# Patient Record
Sex: FEMALE | Race: WHITE | NOT HISPANIC OR LATINO | URBAN - METROPOLITAN AREA
[De-identification: names, ages, dates, MRNs, and addresses within clinical notes are randomized per-mention and may not be internally consistent; named-entity substitution may affect disease eponyms.]

---

## 2017-01-12 ENCOUNTER — OFFICE VISIT (OUTPATIENT)
Dept: OPHTHALMOLOGY | Facility: CLINIC | Age: 67
End: 2017-01-12
Attending: OPHTHALMOLOGY
Payer: COMMERCIAL

## 2017-01-12 VITALS — BODY MASS INDEX: 25.06 KG/M2 | HEIGHT: 69 IN | WEIGHT: 169.2 LBS

## 2017-01-12 DIAGNOSIS — Z98.890 STATUS POST LASIK SURGERY OF BOTH EYES: ICD-10-CM

## 2017-01-12 DIAGNOSIS — H25.12 SENILE NUCLEAR SCLEROSIS, LEFT: ICD-10-CM

## 2017-01-12 DIAGNOSIS — H26.491 POSTERIOR CAPSULAR OPACIFICATION VISUALLY SIGNIFICANT OF RIGHT EYE: ICD-10-CM

## 2017-01-12 DIAGNOSIS — H35.341 LAMELLAR MACULAR HOLE OF RIGHT EYE: Primary | ICD-10-CM

## 2017-01-12 PROCEDURE — 66821 AFTER CATARACT LASER SURGERY: CPT | Mod: ZF | Performed by: OPHTHALMOLOGY

## 2017-01-12 PROCEDURE — 92134 CPTRZ OPH DX IMG PST SGM RTA: CPT | Mod: ZF | Performed by: OPHTHALMOLOGY

## 2017-01-12 PROCEDURE — 76519 ECHO EXAM OF EYE: CPT | Mod: ZF | Performed by: OPHTHALMOLOGY

## 2017-01-12 RX ORDER — PREDNISOLONE ACETATE 10 MG/ML
1 SUSPENSION/ DROPS OPHTHALMIC 4 TIMES DAILY
Qty: 1 BOTTLE | Refills: 1 | Status: SHIPPED | OUTPATIENT
Start: 2017-01-12 | End: 2017-02-08

## 2017-01-12 ASSESSMENT — TONOMETRY
OS_IOP_MMHG: 21
OS_IOP_MMHG: 22
OD_IOP_MMHG: 18
IOP_METHOD: TONOPEN
IOP_METHOD: TONOPEN

## 2017-01-12 ASSESSMENT — VISUAL ACUITY
OD_CC: 20/70-
METHOD: SNELLEN - LINEAR
OS_CC: 20/70-

## 2017-01-12 ASSESSMENT — REFRACTION_MANIFEST
OD_CYLINDER: SPHERE
OS_SPHERE: -6.25
OD_SPHERE: -3.00
OS_CYLINDER: +2.00
OS_AXIS: 075

## 2017-01-12 ASSESSMENT — CUP TO DISC RATIO
OD_RATIO: 0.2
OS_RATIO: 0.2

## 2017-01-12 ASSESSMENT — EXTERNAL EXAM - RIGHT EYE: OD_EXAM: NORMAL

## 2017-01-12 ASSESSMENT — SLIT LAMP EXAM - LIDS
COMMENTS: NORMAL
COMMENTS: NORMAL

## 2017-01-12 ASSESSMENT — EXTERNAL EXAM - LEFT EYE: OS_EXAM: NORMAL

## 2017-01-12 NOTE — PROGRESS NOTES
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  Interested in proceeding with cataract extraction left eye.    Floaters OD stable. No flashes.  Glasses are 3 yrs old.    Eye drops: None    A/P:    Cataract left eye. Appears visually significant, myopic shift left eye  +PVD OU  +myopic degen OU (lamellar macular hole RE; last saw outside [facility] 1 yr ago now retired)    + FH glaucoma mother and maternal grandmother    Risks and benefits of cataract surgery in the left eye were discussed with the patient, including the risk of pain, bleeding, infection, inflammation, elevated eye pressure, the need for additional surgery, loss of vision, loss of the eye, and the possible need to wear glasses postoperatively.      Anesthesia: topical    Trypan blue: No    IFIS solution: No    Malyugin ring: No    Refractive target: -2.00 (to match target of right eye, which ended up -3.00)    IOL type: 1 piece    Toric IOL:  no  Additional considerations:  Post laser in situ keratomileusis (LASIK) calcs      Lamellar macular hole right eye   OCT macula 1/12/17:    Lamellar hole right eye , appears stable    posterior capsular opacity (PCO) right eye    - visually significant with drop in best corrected visual acuity and glare   - rec YAG cap right eye today - completed without complication   - topical Predforte  Four times a day  X 4 days       ~~~~~~~~~~~~~~~~~~~~~~~~~~~~~~~~~~~~~~~~~~~~~~~~~~~~~~~~~~~~~~~~    I have personally examined the patient and agree with the assessment and plan as delineated by the resident / fellow.  I have confirmed the contents of the chief complaint, history of present illness, review of systems, and medical / surgical history sections and edited the note as needed.    Pramod Britt MD, MA  Director, Cornea & Anterior Segment  [facility] Department of  Ophthalmology & Visual Neuroscience

## 2017-01-12 NOTE — MR AVS SNAPSHOT
After Visit Summary   1/12/2017    Marilu Rondon    MRN: [id number]           Patient Information     Date Of Birth          1950        Visit Information        Provider Department      1/12/2017 9:30 AM Pramod Britt MD [facility]        Today's Diagnoses     Lamellar macular hole of right eye    -  1     Senile nuclear sclerosis, left         Status post LASIK surgery of both eyes         Posterior capsular opacification visually significant of right eye            Follow-ups after your visit        Your next 10 appointments already scheduled     Feb 08, 2017  2:00 PM   (Arrive by 1:45 PM)   PAC PHONE RN ASSESSMENT with  Pac Rn   [facility] ([facility])    [address]  4th Floor  [facility] [id number] [phone]           Note: this is not an onsite visit; there is no need to come to the facility.            Feb 15, 2017   Procedure with Pramod Britt MD   [facility] ([facility])    [address] [place] [address]  5th [facility] [id number] [phone]           Located in the Clinics and Surgery Center at [address] [place] [address], [place] [address].   parking is very convenient and highly recommended.  is a $6 flat rate fee; no tips accepted.  Both  and self parkers should enter the main arrival plaza from [facility]; parking attendants will direct you based on your parking preference.            Feb 16, 2017 10:30 AM   Post-Op with Pramod Britt MD   Eye Clinic ([facility])    Y[address]f [address]anes  [address]   [facility] [address] [place] [id number] [phone]            Feb 23, 2017  1:15 PM   Post-Op with Pramod Britt MD   Eye Clinic ([facility])    [address]  [address]   [facility] [address] [place] [address]   [phone]              Who to contact     Please call  "your clinic at [phone] to:    Ask questions about your health    Make or cancel appointments    Discuss your medicines    Learn about your test results    Speak to your doctor   If you have compliments or concerns about an experience at your clinic, or if you wish to file a complaint, please contact [facility] Patient Relations at [phone] or email us at [email or web address]         Additional Information About Your Visit        [employer] Information     QuicklyChat is an electronic gateway that provides easy, online access to your medical records. With QuicklyChat, you can request a clinic appointment, read your test results, renew a prescription or communicate with your care team.     To sign up for QuicklyChat visit the website at [email or web address]   You will be asked to enter the access code listed below, as well as some personal information. Please follow the directions to create your username and password.     Your access code is: [email or web address]  Expires: 2017  8:30 AM     Your access code will  in 90 days. If you need help or a new code, please contact your [facility] or call [phone] for assistance.        Care EveryWhere ID     This is your Care EveryWhere ID. This could be used by other organizations to access your [facility] medical records  [id number]        Your Vitals Were     Height BMI (Body Mass Index)                1.74 m (5' 8.5\") 25.35 kg/m2           Blood Pressure from Last 3 Encounters:   16 98/69   16 110/60   09/08/15 94/62    Weight from Last 3 Encounters:   17 76.749 kg (169 lb 3.2 oz)   16 76.658 kg (169 lb)   16 76.658 kg (169 lb)              We Performed the Following     IOL Biometry w/ IOL calc OU (both eye)     OCT Retina Spectralis OU (both eyes)     Lia-Operative Worksheet     YAG Capsulotomy OD (right eye)          Today's Medication Changes          These changes " are accurate as of: 1/12/17 12:36 PM.  If you have any questions, ask your nurse or doctor.               Start taking these medicines.        Dose/Directions    prednisoLONE acetate 1 % ophthalmic susp   Commonly known as:  PRED FORTE   Used for:  Posterior capsular opacification visually significant of right eye        Dose:  1 drop   Place 1 drop into the right eye 4 times daily   Quantity:  1 Bottle   Refills:  1            Where to get your medicines      These medications were sent to [facility] [address]  [address] [place] [address] [address] [facility] [address]     Phone:  [phone]    - prednisoLONE acetate 1 % ophthalmic susp             Primary Care Provider Office Phone # Fax #    Estefanía Ernandez [phone]       [address] [address]        Thank you!     Thank you for choosing E[facility] CLINIC  for your care. Our goal is always to provide you with excellent care. Hearing back from our patients is one way we can continue to improve our services. Please take a few minutes to complete the written survey that you may receive in the mail after your visit with us. Thank you!             Your Updated Medication List - Protect others around you: Learn how to safely use, store and throw away your medicines at www.disposemymeds.org.          This list is accurate as of: 1/12/17 12:36 PM.  Always use your most recent med list.                   Brand Name Dispense Instructions for use    * order for DME     1 Units    Roll-A-Bout Walker. Patient can use for up to 2 months       * order for DME     1 Units    Walker       prednisoLONE acetate 1 % ophthalmic susp    PRED FORTE    1 Bottle    Place 1 drop into the right eye 4 times daily       SIMVASTATIN PO      Take 20 mg by mouth daily       * Notice:  This list has 2 medication(s) that are the same as other medications prescribed for you. Read the directions  carefully, and ask your doctor or other care provider to review them with you.

## 2017-02-08 ENCOUNTER — APPOINTMENT (OUTPATIENT)
Dept: SURGERY | Facility: CLINIC | Age: 67
End: 2017-02-08

## 2017-02-15 ENCOUNTER — HOSPITAL ENCOUNTER (OUTPATIENT)
Facility: AMBULATORY SURGERY CENTER | Age: 67
End: 2017-02-15
Attending: OPHTHALMOLOGY

## 2017-02-15 ENCOUNTER — ANESTHESIA EVENT (OUTPATIENT)
Dept: SURGERY | Facility: AMBULATORY SURGERY CENTER | Age: 67
End: 2017-02-15

## 2017-02-15 ENCOUNTER — ANESTHESIA (OUTPATIENT)
Dept: SURGERY | Facility: AMBULATORY SURGERY CENTER | Age: 67
End: 2017-02-15

## 2017-02-15 VITALS
TEMPERATURE: 97.4 F | SYSTOLIC BLOOD PRESSURE: 116 MMHG | RESPIRATION RATE: 16 BRPM | DIASTOLIC BLOOD PRESSURE: 73 MMHG | OXYGEN SATURATION: 99 %

## 2017-02-15 DIAGNOSIS — H25.12 NUCLEAR SCLEROTIC CATARACT, LEFT: Primary | ICD-10-CM

## 2017-02-15 DEVICE — IMPLANTABLE DEVICE: Type: IMPLANTABLE DEVICE | Site: EYE | Status: FUNCTIONAL

## 2017-02-15 RX ORDER — LIDOCAINE HYDROCHLORIDE 10 MG/ML
INJECTION, SOLUTION EPIDURAL; INFILTRATION; INTRACAUDAL; PERINEURAL PRN
Status: DISCONTINUED | OUTPATIENT
Start: 2017-02-15 | End: 2017-02-15 | Stop reason: HOSPADM

## 2017-02-15 RX ORDER — SODIUM CHLORIDE, SODIUM LACTATE, POTASSIUM CHLORIDE, CALCIUM CHLORIDE 600; 310; 30; 20 MG/100ML; MG/100ML; MG/100ML; MG/100ML
500 INJECTION, SOLUTION INTRAVENOUS CONTINUOUS
Status: DISCONTINUED | OUTPATIENT
Start: 2017-02-15 | End: 2017-02-16 | Stop reason: HOSPADM

## 2017-02-15 RX ORDER — PREDNISOLONE ACETATE 10 MG/ML
1 SUSPENSION/ DROPS OPHTHALMIC 4 TIMES DAILY
Qty: 1 BOTTLE | Refills: 0 | Status: SHIPPED | OUTPATIENT
Start: 2017-02-15 | End: 2017-03-22

## 2017-02-15 RX ORDER — TROPICAMIDE 10 MG/ML
1 SOLUTION/ DROPS OPHTHALMIC
Status: COMPLETED | OUTPATIENT
Start: 2017-02-15 | End: 2017-02-15

## 2017-02-15 RX ORDER — PHENYLEPHRINE HYDROCHLORIDE 25 MG/ML
1 SOLUTION/ DROPS OPHTHALMIC
Status: COMPLETED | OUTPATIENT
Start: 2017-02-15 | End: 2017-02-15

## 2017-02-15 RX ORDER — PROPARACAINE HYDROCHLORIDE 5 MG/ML
1 SOLUTION/ DROPS OPHTHALMIC ONCE
Status: COMPLETED | OUTPATIENT
Start: 2017-02-15 | End: 2017-02-15

## 2017-02-15 RX ORDER — KETOROLAC TROMETHAMINE 5 MG/ML
1 SOLUTION OPHTHALMIC 4 TIMES DAILY
Qty: 5 ML | Refills: 0 | Status: SHIPPED | OUTPATIENT
Start: 2017-02-15 | End: 2017-03-22

## 2017-02-15 RX ORDER — LIDOCAINE 40 MG/G
CREAM TOPICAL
Status: DISCONTINUED | OUTPATIENT
Start: 2017-02-15 | End: 2017-02-16 | Stop reason: HOSPADM

## 2017-02-15 RX ORDER — OFLOXACIN 3 MG/ML
1 SOLUTION/ DROPS OPHTHALMIC 4 TIMES DAILY
Qty: 1 BOTTLE | Refills: 0 | Status: SHIPPED | OUTPATIENT
Start: 2017-02-15 | End: 2017-03-22

## 2017-02-15 RX ORDER — BALANCED SALT SOLUTION 6.4; .75; .48; .3; 3.9; 1.7 MG/ML; MG/ML; MG/ML; MG/ML; MG/ML; MG/ML
SOLUTION OPHTHALMIC PRN
Status: DISCONTINUED | OUTPATIENT
Start: 2017-02-15 | End: 2017-02-15 | Stop reason: HOSPADM

## 2017-02-15 RX ADMIN — TROPICAMIDE 1 DROP: 10 SOLUTION/ DROPS OPHTHALMIC at 06:30

## 2017-02-15 RX ADMIN — TROPICAMIDE 1 DROP: 10 SOLUTION/ DROPS OPHTHALMIC at 06:35

## 2017-02-15 RX ADMIN — SODIUM CHLORIDE, SODIUM LACTATE, POTASSIUM CHLORIDE, CALCIUM CHLORIDE: 600; 310; 30; 20 INJECTION, SOLUTION INTRAVENOUS at 07:24

## 2017-02-15 RX ADMIN — TROPICAMIDE 1 DROP: 10 SOLUTION/ DROPS OPHTHALMIC at 06:42

## 2017-02-15 RX ADMIN — SODIUM CHLORIDE, SODIUM LACTATE, POTASSIUM CHLORIDE, CALCIUM CHLORIDE 500 ML: 600; 310; 30; 20 INJECTION, SOLUTION INTRAVENOUS at 07:16

## 2017-02-15 RX ADMIN — PHENYLEPHRINE HYDROCHLORIDE 1 DROP: 25 SOLUTION/ DROPS OPHTHALMIC at 06:41

## 2017-02-15 RX ADMIN — PHENYLEPHRINE HYDROCHLORIDE 1 DROP: 25 SOLUTION/ DROPS OPHTHALMIC at 06:35

## 2017-02-15 RX ADMIN — PHENYLEPHRINE HYDROCHLORIDE 1 DROP: 25 SOLUTION/ DROPS OPHTHALMIC at 06:30

## 2017-02-15 RX ADMIN — PROPARACAINE HYDROCHLORIDE 1 DROP: 5 SOLUTION/ DROPS OPHTHALMIC at 07:16

## 2017-02-15 NOTE — IP AVS SNAPSHOT
MRN:[id number]                      After Visit Summary   2/15/2017    Marilu Rondon    MRN: [id number]           Thank you!     Thank you for choosing [facility] for your care. Our goal is always to provide you with excellent care. Hearing back from our patients is one way we can continue to improve our services. Please take a few minutes to complete the written survey that you may receive in the mail after you visit with us. Thank you!        Patient Information     Date Of Birth          1950        About your hospital stay     You were admitted on:  February 15, 2017 You last received care in the:  [facility]    You were discharged on:  February 15, 2017       Who to Call     For medical emergencies, please call 911.  For non-urgent questions about your medical care, please call your primary care provider or clinic, [phone]  For questions related to your surgery, please call your surgery clinic        Attending Provider     Provider Specialty    Pramod Britt MD Ophthalmology       Primary Care Provider Office Phone # Fax #    Estefanía Ernandez [phone]       [address] [address]        Your next 10 appointments already scheduled     Feb 16, 2017 10:30 AM CST   Post-Op with Pramod Britt MD   Eye Clinic ([facility])    [address]  [address] [place] [id number]   [phone]            Feb 23, 2017  1:15 PM CST   Post-Op with Pramod Britt MD   Eye Clinic ([facility])    [address]  [address] [place] [address]   [phone]              Further instructions from your care team       [facility]     Home Care Following Cataract Surgery    If you have a gauze eye patch on, please do not remove it until it is removed by your doctor at your  first appointment after your surgery.  You will start your eye drops the next day.    OR    If you only have a clear eye shield on, you may remove the eye shield when you get home and begin eye drops today as directed by your doctor.      Wear the clear eye shield for protection when sleeping for the next 5 days.      Do not rub the eye that had the operation.      Your eye might be sensitive to light.  Wearing sunglasses may be more comfortable for you.      You may have some discomfort and irritation.  Acetaminophen (Tylenol) or Ibuprofen (Advil) may be taken for discomfort. If pain persists please call your doctor s office.      Keep the eye that had the surgery dry. You may wash your hair, bathe or shower, but keep your eye closed while doing so.       Avoid bending over, strenuous activity or heavy lifting until this activity has been approved by your doctor.      You have a follow-up appointment with your doctor tomorrow at the [facility] ([phone]).  Bring all your prescribed eye drops with you to this follow-up appointment.        If you take glaucoma medications, bring them with you to your follow-up appointment tomorrow.      Use medication exactly as prescribed by your doctor. You may restart your regular home medications.       Call your doctor s office at [phone] if any of the following should occur:    - Any sudden vision changes, including decreased vision  - Nausea or severe headache  - Increase in pain that is not controlled with Acetaminophen (Tylenol) or Ibuprofen (Advil)  - Signs of infection (pus, increasing redness or tenderness)  - Severe sensitivity to light  - An increase in floaters (black spots in front of your vision)    [facility]  Home Care Following Anesthesia  For 24 hours after surgery:  1. Get plenty of rest.  A responsible adult must stay with you for at least 24 hours after you leave the surgery center.  2. Do  not drive or use heavy equipment.  If you have weakness or tingling, don't drive or use heavy equipment until this feeling goes away.   3. Do not drink alcohol.   4. Avoid strenuous or risky activities.  Ask for help when climbing stairs.  5. You may feel lightheaded.  IF so, sit for a few minutes before standing.  Have someone help you get up.   6. If you have nausea (feel sick to your stomach): Drink only clear liquids such as apple juice, ginger ale, broth or 7-Up.  Rest may also help.  Be sure to drink enough fluids.  Move to a regular diet as you feel able.   7. You may have a slight fever.  Call the doctor if your fever is over 100 F (37.7 C) (taken under the tongue) or lasts longer than 24 hours.  8. You may have a dry mouth, a sore throat, muscle aches or trouble sleeping. These should go away after 24 hours.  9. Do not make important or legal decisions.               Tips for taking pain medications  To get the best pain relief possible, remember these points:    Take pain medications as directed, before pain becomes severe.    Pain medication can upset your stomach: taking it with food may help.    Constipation is a common side effect of pain medication. Drink plenty of  fluids.    Eat foods high in fiber. Take a stool softener if recommended by your doctor or pharmacist.    Do not drink alcohol, drive or operate machinery while taking pain medications.    Ask about other ways to control pain, such as with heat, ice or relaxation.    Call a doctor for any of the followin. Signs of infection (fever, growing tenderness at the surgery site, a large amount of drainage or bleeding, severe pain, foul-smelling drainage, redness, swelling).  2. It has been over 8 to 10 hours since surgery and you are still not able to urinate (pass water).  3. Headache for over 24 hours.  4. Numbness, tingling or weakness the day after surgery (if you had spinal anesthesia).  Your doctor is:       Dr. Pramod Britt,  Ophthalmology: [phone]               Or dial [phone] and ask for the resident on call for:  Ophthalmology  For emergency care, call the:  [place]:  [phone] (TTY for hearing impaired: [phone])    Pending Results     No orders found from 2017 to 2017.            Admission Information     Date & Time Provider Department Dept. Phone    2/15/2017 Page, Pramod Cruz MD [facility] [phone]      Your Vitals Were     Blood Pressure Temperature Respirations Pulse Oximetry          110/72 97.6  F (36.4  C) (Oral) 16 97%        MyChart Information     Dovetailt is an electronic gateway that provides easy, online access to your medical records. With Spock, you can request a clinic appointment, read your test results, renew a prescription or communicate with your care team.     To sign up for Spock visit the website at [email or web address]   You will be asked to enter the access code listed below, as well as some personal information. Please follow the directions to create your username and password.     Your access code is: [email or web address]  Expires: 2017  8:30 AM     Your access code will  in 90 days. If you need help or a new code, please contact your [facility] or call [phone] for assistance.        Care EveryWhere ID     This is your Care EveryWhere ID. This could be used by other organizations to access your [facility] medical records  [id number]           Review of your medicines      UNREVIEWED medicines. Ask your doctor about these medicines        Dose / Directions    SIMVASTATIN PO        Dose:  20 mg   Take 20 mg by mouth every morning   Refills:  0         START taking        Dose / Directions    ketorolac 0.5 % ophthalmic solution   Commonly known as:  ACULAR   Used for:  Nuclear sclerotic cataract, left        Dose:  1 drop   Place 1 drop Into the left eye 4 times daily    Quantity:  5 mL   Refills:  0       ofloxacin 0.3 % ophthalmic solution   Commonly known as:  OCUFLOX   Used for:  Nuclear sclerotic cataract, left        Dose:  1 drop   Place 1 drop Into the left eye 4 times daily   Quantity:  1 Bottle   Refills:  0       prednisoLONE acetate 1 % ophthalmic susp   Commonly known as:  PRED FORTE   Used for:  Nuclear sclerotic cataract, left        Dose:  1 drop   Place 1 drop Into the left eye 4 times daily   Quantity:  1 Bottle   Refills:  0         CONTINUE these medicines which have NOT CHANGED        Dose / Directions    * order for DME   Used for:  Closed displaced fracture of fifth metatarsal bone of left foot, initial encounter        Roll-A-Bout Walker. Patient can use for up to 2 months   Quantity:  1 Units   Refills:  0       * order for DME   Used for:  Closed displaced fracture of fifth metatarsal bone of left foot with routine healing, subsequent encounter        Walker   Quantity:  1 Units   Refills:  0       * Notice:  This list has 2 medication(s) that are the same as other medications prescribed for you. Read the directions carefully, and ask your doctor or other care provider to review them with you.         Where to get your medicines      These medications were sent to [address] [address]    Hours:  TRANSPLANT PHONE NUMBER [phone] Phone:  [phone]     ketorolac 0.5 % ophthalmic solution    ofloxacin 0.3 % ophthalmic solution    prednisoLONE acetate 1 % ophthalmic susp                Protect others around you: Learn how to safely use, store and throw away your medicines at www.disposemymeds.org.             Medication List: This is a list of all your medications and when to take them. Check marks below indicate your daily home schedule. Keep this list as a reference.      Medications           Morning Afternoon Evening Bedtime As Needed     ketorolac 0.5 % ophthalmic solution   Commonly known as:  ACULAR   Place 1 drop Into the left eye 4 times daily                                ofloxacin 0.3 % ophthalmic solution   Commonly known as:  OCUFLOX   Place 1 drop Into the left eye 4 times daily                                * order for DME   Roll-A-Bout Walker. Patient can use for up to 2 months                                * order for DME   Walker                                prednisoLONE acetate 1 % ophthalmic susp   Commonly known as:  PRED FORTE   Place 1 drop Into the left eye 4 times daily                                SIMVASTATIN PO   Take 20 mg by mouth every morning                                * Notice:  This list has 2 medication(s) that are the same as other medications prescribed for you. Read the directions carefully, and ask your doctor or other care provider to review them with you.

## 2017-02-15 NOTE — DISCHARGE INSTRUCTIONS
[facility]     Home Care Following Cataract Surgery    If you have a gauze eye patch on, please do not remove it until it is removed by your doctor at your first appointment after your surgery.  You will start your eye drops the next day.    OR    If you only have a clear eye shield on, you may remove the eye shield when you get home and begin eye drops today as directed by your doctor.      Wear the clear eye shield for protection when sleeping for the next 5 days.      Do not rub the eye that had the operation.      Your eye might be sensitive to light.  Wearing sunglasses may be more comfortable for you.      You may have some discomfort and irritation.  Acetaminophen (Tylenol) or Ibuprofen (Advil) may be taken for discomfort. If pain persists please call your doctor s office.      Keep the eye that had the surgery dry. You may wash your hair, bathe or shower, but keep your eye closed while doing so.       Avoid bending over, strenuous activity or heavy lifting until this activity has been approved by your doctor.      You have a follow-up appointment with your doctor tomorrow at the [facility] ([phone]).  Bring all your prescribed eye drops with you to this follow-up appointment.        If you take glaucoma medications, bring them with you to your follow-up appointment tomorrow.      Use medication exactly as prescribed by your doctor. You may restart your regular home medications.       Call your doctor s office at [phone] if any of the following should occur:    - Any sudden vision changes, including decreased vision  - Nausea or severe headache  - Increase in pain that is not controlled with Acetaminophen (Tylenol) or Ibuprofen (Advil)  - Signs of infection (pus, increasing redness or tenderness)  - Severe sensitivity to light  - An increase in floaters (black spots in front of your vision)    [facility]  Home Care Following Anesthesia  For 24 hours after surgery:  1. Get plenty of rest.  A responsible adult must stay with you for at least 24 hours after you leave the surgery center.  2. Do not drive or use heavy equipment.  If you have weakness or tingling, don't drive or use heavy equipment until this feeling goes away.   3. Do not drink alcohol.   4. Avoid strenuous or risky activities.  Ask for help when climbing stairs.  5. You may feel lightheaded.  IF so, sit for a few minutes before standing.  Have someone help you get up.   6. If you have nausea (feel sick to your stomach): Drink only clear liquids such as apple juice, ginger ale, broth or 7-Up.  Rest may also help.  Be sure to drink enough fluids.  Move to a regular diet as you feel able.   7. You may have a slight fever.  Call the doctor if your fever is over 100 F (37.7 C) (taken under the tongue) or lasts longer than 24 hours.  8. You may have a dry mouth, a sore throat, muscle aches or trouble sleeping. These should go away after 24 hours.  9. Do not make important or legal decisions.               Tips for taking pain medications  To get the best pain relief possible, remember these points:    Take pain medications as directed, before pain becomes severe.    Pain medication can upset your stomach: taking it with food may help.    Constipation is a common side effect of pain medication. Drink plenty of  fluids.    Eat foods high in fiber. Take a stool softener if recommended by your doctor or pharmacist.    Do not drink alcohol, drive or operate machinery while taking pain medications.    Ask about other ways to control pain, such as with heat, ice or relaxation.    Call a doctor for any of the followin. Signs of infection (fever, growing tenderness at the surgery site, a large amount of drainage or bleeding, severe pain, foul-smelling drainage, redness, swelling).  2. It has been over 8 to 10 hours since surgery and you are still not able  to urinate (pass water).  3. Headache for over 24 hours.  4. Numbness, tingling or weakness the day after surgery (if you had spinal anesthesia).  Your doctor is:       Dr. Pramod Britt, Ophthalmology: [phone]               Or dial [phone] and ask for the resident on call for:  Ophthalmology  For emergency care, call the:  [facility]:  [phone] (TTY for hearing impaired: [phone])

## 2017-02-15 NOTE — ANESTHESIA PREPROCEDURE EVALUATION
Anesthesia Evaluation     .        ROS/MED HX    ENT/Pulmonary:  - neg pulmonary ROS     Neurologic:  - neg neurologic ROS     Cardiovascular:  - neg cardiovascular ROS       METS/Exercise Tolerance:     Hematologic:  - neg hematologic  ROS       Musculoskeletal:  - neg musculoskeletal ROS       GI/Hepatic:  - neg GI/hepatic ROS       Renal/Genitourinary:  - ROS Renal section negative       Endo:  - neg endo ROS       Psychiatric:  - neg psychiatric ROS       Infectious Disease:  - neg infectious disease ROS       Malignancy:      - no malignancy   Other:               Physical Exam  Normal systems: pulmonary    Airway   Mallampati: II  TM distance: >3 FB  Neck ROM: full    Dental     Cardiovascular   Rhythm and rate: regular      Pulmonary    breath sounds clear to auscultation                    Anesthesia Plan      History & Physical Review  History and physical reviewed and following examination; no interval change.    ASA Status:  1 .    NPO Status:  > 8 hours    Plan for MAC with Other induction. Maintenance will be Other.  Reason for MAC:  Other - see comments  PONV prophylaxis:  Ondansetron (or other 5HT-3)       Postoperative Care  Postoperative pain management:  Multi-modal analgesia.      Consents  Anesthetic plan, risks, benefits and alternatives discussed with:  Patient..                      Anesthesia Pre-op Note    Marilu Rondon is a 66 year old female with past medical as described below is scheduled for Procedure(s):  Left Eye Cataract Extraction Phacoemulsification with Intraocular Lense Placement, Standard - Wound Class: I-Clean    Past Medical History   Diagnosis Date     Chest pain 8/31/2015     Nonsenile cataract      Past Surgical History   Procedure Laterality Date     C rad resec tonsil/pillars  1975     Cataract iol, rt/lt  4/8/14     RE     Family History   Problem Relation Age of Onset     Glaucoma Mother      CANCER Mother      DIABETES Brother      Hypertension Brother       DIABETES Father      Hypertension Father      Hyperlipidemia Father      Coronary Artery Disease Father      DIABETES Brother      Hypertension Brother      Hyperlipidemia Brother      Coronary Artery Disease Brother      Social History     Social History     Marital status:      Spouse name: N/A     Number of children: N/A     Years of education: N/A     Occupational History     Not on file.     Social History Main Topics     Smoking status: Never Smoker     Smokeless tobacco: Not on file     Alcohol use Yes      Comment: 1 glass of red wine daily.     Drug use: No     Sexual activity: Not Currently     Other Topics Concern     Not on file     Social History Narrative     Current Outpatient Prescriptions   Medication Sig Dispense Refill     SIMVASTATIN PO Take 20 mg by mouth every morning        order for DME Walker 1 Units 0     order for DME Roll-A-Bout Walker. Patient can use for up to 2 months 1 Units 0     Current Outpatient Prescriptions   Medication     SIMVASTATIN PO     order for DME     order for DME     Current Facility-Administered Medications   Medication     lactated ringers infusion     lidocaine 1 % 1 mL     lidocaine (LMX4) kit     sodium chloride (PF) 0.9% PF flush 3 mL     sodium chloride (PF) 0.9% PF flush 3 mL      No Known Allergies      Lab:     CBC RESULTS:   Recent Labs   Lab Test  03/20/12   1025   WBC  3.9*   RBC  4.11   HGB  12.6   HCT  39.0   MCV  95   MCH  30.7   MCHC  32.3   RDW  13.5   PLT  200     Recent Labs   Lab Test  09/02/15   1039  03/20/12   1025   NA  143  143   POTASSIUM  4.2  4.1   CHLORIDE  112*  107   CO2  25  29   ANIONGAP  6  6   GLC  91  76   BUN  14  16   CR  0.76  0.73   DAVID  9.0  9.3     The laboratory has evaluated your INR and PTT and the results are as listed below.    No results found for: INR  No results found for: PTT

## 2017-02-15 NOTE — ANESTHESIA CARE TRANSFER NOTE
Patient: Marilu Rondon    Procedure(s):  Left Eye Cataract Extraction Phacoemulsification with Intraocular Lense Placement, Standard - Wound Class: I-Clean    Diagnosis: Nuclear Sclerosis Cataract  Diagnosis Additional Information: No value filed.    Anesthesia Type:   No value filed.     Note:  Airway :Room Air  Patient transferred to:Phase II  Comments: To Procedures phase 2 recovery.  VSS  Awake and oriented   Denies discomfort report to RN.         Vitals: (Last set prior to Anesthesia Care Transfer)    CRNA VITALS  2/15/2017 0730 - 2/15/2017 0800      2/15/2017             NIBP: 110/67    Ht Rate: 61    SpO2: 100 %                Electronically Signed By: KIRAN Mccord CRNA  February 15, 2017  8:00 AM

## 2017-02-15 NOTE — IP AVS SNAPSHOT
[facility]    [address] [place] [address]    Phone:  [phone]    Fax:  [phone]                                       After Visit Summary   2/15/2017    Marilu Rondon    MRN: [id number]           After Visit Summary Signature Page     I have received my discharge instructions, and my questions have been answered. I have discussed any challenges I see with this plan with the nurse or doctor.    ..........................................................................................................................................  Patient/Patient Representative Signature      ..........................................................................................................................................  Patient Representative Print Name and Relationship to Patient    ..................................................               ................................................  Date                                            Time    ..........................................................................................................................................  Reviewed by Signature/Title    ...................................................              ..............................................  Date                                                            Time

## 2017-02-15 NOTE — ANESTHESIA POSTPROCEDURE EVALUATION
Patient: Marilu Rondon    Procedure(s):  Left Eye Cataract Extraction Phacoemulsification with Intraocular Lense Placement, Standard - Wound Class: I-Clean    Diagnosis:Nuclear Sclerosis Cataract  Diagnosis Additional Information: No value filed.    Anesthesia Type:  No value filed.    Note:  Anesthesia Post Evaluation    Last vitals:  Vitals:    02/15/17 0623 02/15/17 0804   BP: 110/77 110/72   Resp: 16 16   Temp: 36.4  C (97.6  F) 36.4  C (97.6  F)   SpO2: 95% 97%         Electronically Signed By: Aguilar Tracy MD  February 15, 2017  8:26 AM

## 2017-02-15 NOTE — PROCEDURES
OPHTHALMOLOGY OPERATIVE REPORT    PATIENT NAME: Marilu Rondon  DATE: 2/15/2017    SURGEON:  Pramod Britt MD  ASSISTANT  none    PREOP DIAGNOSIS: visually significant nuclear sclerotic cataract left eye  POSTOP DIAGNOSIS: same    ANESTHESIA: MAC topical  COMPLICATIONS: none    LENS SPECIFICATIONS: SN60WF 16.5 Diopters    INDICATION FOR PROCEDURE  The patient has a visually-significant cataract that has been affecting their activities of daily living. The risks including, but not limited to infection, loss of vision, loss of eye, need for more surgery, and bleeding, along with the benefits, alternatives, expectations, and the procedure itself were discussed at length with the patient who wished to proceed with surgery.    DESCRIPTION OF PROCEDURE  In the preoperative suite, the patient was identified, the surgical site marked and informed consent was obtained. The patient was then brought back to the operative suite where the appropriate anesthesia monitors were connected. The patient's eye was prepped and draped in the usual sterile fashion for ophthalmic surgery.    A scleral fixation ring and a supersharp blade were used to make a paracentesis incision. Aqueous was replaced with preservative free lidocaine followed by viscoat. A 2.8 mm keratome was used to make a temporal, triplanar clear corneal incision. Capsulorrhexis was completed with a bent cystitome and Utrata forceps. Hydrodissection of the nucleus was achieved with the Oh cannula. The nucleus was found to move freely within the capsular bag. The cataract was successfully removed.    The irrigation and aspiration handpiece was used to remove the cortex. The capsular bag was filled with Healon. The intraocular lens was injected into the capsular bag. The remaining viscoelastic was removed using irrigation and aspiration. The lens was found to be well-centered and in the capsular bag. BSS on a cannula was used to hydrate the clear corneal and  paracentesis incisions. Weck-tiffanie sponges were used to confirm there were no leaks from the incisions.    A shield was taped over the eye. The patient was then taken to the recovery room in stable condition having tolerated the procedure well and discharged home in good condition.    Dr. Pramod Britt was scrubbed and present for the entire case.

## 2017-02-16 ENCOUNTER — OFFICE VISIT (OUTPATIENT)
Dept: OPHTHALMOLOGY | Facility: CLINIC | Age: 67
End: 2017-02-16
Attending: OPHTHALMOLOGY
Payer: COMMERCIAL

## 2017-02-16 DIAGNOSIS — Z96.1 PSEUDOPHAKIA: Primary | ICD-10-CM

## 2017-02-16 DIAGNOSIS — H35.341 LAMELLAR MACULAR HOLE OF RIGHT EYE: ICD-10-CM

## 2017-02-16 PROCEDURE — 99213 OFFICE O/P EST LOW 20 MIN: CPT | Mod: ZF

## 2017-02-16 ASSESSMENT — TONOMETRY
IOP_METHOD: ICARE
OS_IOP_MMHG: 20
OD_IOP_MMHG: 18

## 2017-02-16 ASSESSMENT — VISUAL ACUITY
OS_SC: 20/150
OD_CC+: +/-
OD_CC: 20/60
OS_PH_SC: 20/40-
METHOD: SNELLEN - LINEAR

## 2017-02-16 ASSESSMENT — SLIT LAMP EXAM - LIDS
COMMENTS: NORMAL
COMMENTS: NORMAL

## 2017-02-16 ASSESSMENT — EXTERNAL EXAM - RIGHT EYE: OD_EXAM: NORMAL

## 2017-02-16 ASSESSMENT — EXTERNAL EXAM - LEFT EYE: OS_EXAM: NORMAL

## 2017-02-16 NOTE — NURSING NOTE
"Chief Complaints and History of Present Illnesses   Patient presents with     Follow Up For     PCIOL OS 02/15/2017     HPI    Affected eye(s):  Left   Symptoms:     No blurred vision   No decreased vision         Do you have eye pain now?:  No      Comments:  Vision is \"not bad.\" Pt states vision is better today than it was yesterday.  Steph Jay COA 10:22 AM February 16, 2017                "

## 2017-02-16 NOTE — MR AVS SNAPSHOT
After Visit Summary   2017    Marilu Rondon    MRN: [id number]           Patient Information     Date Of Birth          1950        Visit Information        Provider Department      2017 10:30 AM Pramod Britt MD [facility]        Today's Diagnoses     Pseudophakia - Both Eyes    -  1    Lamellar macular hole of right eye           Follow-ups after your visit        Your next 10 appointments already scheduled     Mar 22, 2017  1:00 PM CDT   Post-Op with Bharath Cooper MD   Eye Clinic ([facility])    [address]  [address] [facility]  9[address] Clin [address]  [facility] [address] [phone]              Who to contact     Please call your clinic at [phone] to:    Ask questions about your health    Make or cancel appointments    Discuss your medicines    Learn about your test results    Speak to your doctor   If you have compliments or concerns about an experience at your clinic, or if you wish to file a complaint, please contact [facility] Patient Relations at [phone] or email us at [email or web address]         Additional Information About Your Visit        MyChart Information     Zazoot is an electronic gateway that provides easy, online access to your medical records. With [email or web address], you can request a clinic appointment, read your test results, renew a prescription or communicate with your care team.     To sign up for Zazoot visit the website at [email or web address]   You will be asked to enter the access code listed below, as well as some personal information. Please follow the directions to create your username and password.     Your access code is: [email or web address]  Expires: 2017  8:30 AM     Your access code will  in 90 days. If you need help or a new code, please contact your [facility] or call [phone] for assistance.        Care EveryWhere ID     This  is your Care EveryWhere ID. This could be used by other organizations to access your [facility] medical records  [id number]         Blood Pressure from Last 3 Encounters:   02/15/17 116/73   04/06/16 98/69   04/03/16 110/60    Weight from Last 3 Encounters:   02/24/17 76.7 kg (169 lb 1.6 oz)   01/12/17 76.7 kg (169 lb 3.2 oz)   08/12/16 76.7 kg (169 lb)              Today, you had the following     No orders found for display       Primary Care Provider Office Phone # Fax #    Estefanía Ernandez [phone]       [address] [address]        Thank you!     Thank you for choosing [facility]INIC  for your care. Our goal is always to provide you with excellent care. Hearing back from our patients is one way we can continue to improve our services. Please take a few minutes to complete the written survey that you may receive in the mail after your visit with us. Thank you!             Your Updated Medication List - Protect others around you: Learn how to safely use, store and throw away your medicines at www.disposemymeds.org.          This list is accurate as of: 2/16/17 11:59 PM.  Always use your most recent med list.                   Brand Name Dispense Instructions for use    ketorolac 0.5 % ophthalmic solution    ACULAR    5 mL    Place 1 drop Into the left eye 4 times daily       ofloxacin 0.3 % ophthalmic solution    OCUFLOX    1 Bottle    Place 1 drop Into the left eye 4 times daily       * order for DME     1 Units    Roll-A-Bout Walker. Patient can use for up to 2 months       * order for DME     1 Units    Walker       prednisoLONE acetate 1 % ophthalmic susp    PRED FORTE    1 Bottle    Place 1 drop Into the left eye 4 times daily       SIMVASTATIN PO      Take 20 mg by mouth every morning       * Notice:  This list has 2 medication(s) that are the same as other medications prescribed for you. Read the directions carefully, and ask your doctor or other care  provider to review them with you.

## 2017-02-16 NOTE — PROGRESS NOTES
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 No flashes.  Glasses are 3 yrs old.      Interval history: Doing well, no pain or discomfort. Vision somewhat blurry.    Eye drops: Prednisolone acetate four times a day           Acular four times a day            Ofloxacin QID  A/P:    Postoperative day#1 s/p Cataract extraction/IOL left eye    Doing well, 20/150 but pinholes to 20/40.   Mild to mod dryness  PFATs  Prednisone four times a day  X 1 week, three times a day x 1 week, twice a day x 1 week, once a day x 1 week, then stop  Ketorolac four times a day  X 1 week, three times a day x 1 week, twice a day x 1 week, once a day x 1 week, then stop  Ofloxacin four times a day  X 1 week, then stop.      Lamellar macular hole right eye   OCT macula 1/12/17:    Lamellar hole right eye , appears stable    posterior capsular opacity (PCO) right eye s/p Yag capsulotomy   -did not notice large improvement s/p Yag, but visual acuity did improve by one line   -doing well, intraocular pressure normal    Myopic degeneration  Previously a patient of Dr Gomez. Has not been seen in several years.  Large bothersome floater in the right eye and interested in possible laser vitreolysis  Should have retina evaluation at least yearly  Follow up with Dr Gonzalez in the next few weeks/months       Marcelo Vann MD  PGY3, Department of Ophthalmology       ~~~~~~~~~~~~~~~~~~~~~~~~~~~~~~~~~~~~~~~~~~~~~~~~~~~~~~~~~~~~~~~~    I have personally examined the patient and agree with the assessment and plan as delineated by the resident / fellow.  I have confirmed the contents of the chief complaint, history of present illness, review of systems, and medical / surgical history sections and edited the note as needed.    Pramod Britt MD, MA  Director, Cornea  & Anterior Segment  [facility] Department of Ophthalmology & Visual Neuroscience

## 2017-02-23 ENCOUNTER — OFFICE VISIT (OUTPATIENT)
Dept: OPHTHALMOLOGY | Facility: CLINIC | Age: 67
End: 2017-02-23
Attending: OPHTHALMOLOGY
Payer: COMMERCIAL

## 2017-02-23 DIAGNOSIS — Z96.1 PSEUDOPHAKIA: Primary | ICD-10-CM

## 2017-02-23 DIAGNOSIS — M79.672 LEFT FOOT PAIN: Primary | ICD-10-CM

## 2017-02-23 DIAGNOSIS — S92.352D CLOSED DISPLACED FRACTURE OF FIFTH METATARSAL BONE OF LEFT FOOT WITH ROUTINE HEALING, SUBSEQUENT ENCOUNTER: ICD-10-CM

## 2017-02-23 PROCEDURE — 99212 OFFICE O/P EST SF 10 MIN: CPT | Mod: ZF

## 2017-02-23 ASSESSMENT — VISUAL ACUITY
OD_CC: 20/40
OS_PH_SC: 20/30
OS_SC+: +1
METHOD: SNELLEN - LINEAR
OD_CC+: +1
OS_SC: 20/60

## 2017-02-23 ASSESSMENT — SLIT LAMP EXAM - LIDS
COMMENTS: NORMAL
COMMENTS: NORMAL

## 2017-02-23 ASSESSMENT — EXTERNAL EXAM - RIGHT EYE: OD_EXAM: NORMAL

## 2017-02-23 ASSESSMENT — TONOMETRY
OS_IOP_MMHG: 18
OD_IOP_MMHG: 13
IOP_METHOD: ICARE

## 2017-02-23 ASSESSMENT — EXTERNAL EXAM - LEFT EYE: OS_EXAM: NORMAL

## 2017-02-23 NOTE — MR AVS SNAPSHOT
After Visit Summary   2/23/2017    Marilu Rondon    MRN: [id number]           Patient Information     Date Of Birth          1950        Visit Information        Provider Department      2/23/2017 1:15 PM Pramod Britt MD [facility]        Today's Diagnoses     Pseudophakia    -  1      Care Instructions    Stop ofloxacin      Taper prednisolone to three times a day for one week then twice a day for one week then once a day for one week then stop     Taper ketorolac to three times a day for one week then twice a day for one week then once a day for one week then stop        Follow-ups after your visit        Your next 10 appointments already scheduled     Feb 24, 2017 11:00 AM CST   XR FOOT LEFT G/E 3 VIEWS with [id number]   [facility] ([facility])    [address] [place] [id number] [phone]           Please bring a list of your current medicines to your exam. (Include vitamins, minerals and over-thecounter medicines.) Leave your valuables at home.  Tell your doctor if there is a chance you may be pregnant.  You do not need to do anything special for this exam.            Feb 24, 2017 11:15 AM CST   (Arrive by 11:00 AM)   Return Visit with Jeffrey Emerson MD   [facility] ([facility])    [address] [place] [address] [phone]            Mar 22, 2017  1:00 PM CDT   Post-Op with Bharath Cooper MD   Eye Clinic ([facility])    [address]  [address] [facility]  [address] Clin [address]  [facility] [address] [phone]              Future tests that were ordered for you today     Open Future Orders        Priority Expected Expires Ordered    XR Foot LT G/E 3 vw Routine 2/24/2017 3/25/2017 2/23/2017            Who to contact     Please call your clinic at [phone] to:    Ask questions about your health    Make or  cancel appointments    Discuss your medicines    Learn about your test results    Speak to your doctor   If you have compliments or concerns about an experience at your clinic, or if you wish to file a complaint, please contact [facility] Patient Relations at [phone] or email us at [email or web address]         Additional Information About Your Visit        Diet4Lifehart Information     ACTV8t is an electronic gateway that provides easy, online access to your medical records. With Kopjra, you can request a clinic appointment, read your test results, renew a prescription or communicate with your care team.     To sign up for ACTV8t visit the website at [email or web address]   You will be asked to enter the access code listed below, as well as some personal information. Please follow the directions to create your username and password.     Your access code is: [email or web address]  Expires: 2017  8:30 AM     Your access code will  in 90 days. If you need help or a new code, please contact your [facility] or call [phone] for assistance.        Care EveryWhere ID     This is your Care EveryWhere ID. This could be used by other organizations to access your [place] medical records  [id number]         Blood Pressure from Last 3 Encounters:   02/15/17 116/73   16 98/69   16 110/60    Weight from Last 3 Encounters:   17 76.7 kg (169 lb 3.2 oz)   16 76.7 kg (169 lb)   16 76.7 kg (169 lb)              Today, you had the following     No orders found for display       Primary Care Provider Office Phone # Fax #    Estefanía Ernandez [phone]       [address] [address]        Thank you!     Thank you for choosing [facility]  for your care. Our goal is always to provide you with excellent care. Hearing back from our patients is one way we can continue to  improve our services. Please take a few minutes to complete the written survey that you may receive in the mail after your visit with us. Thank you!             Your Updated Medication List - Protect others around you: Learn how to safely use, store and throw away your medicines at www.disposemymeds.org.          This list is accurate as of: 2/23/17  2:15 PM.  Always use your most recent med list.                   Brand Name Dispense Instructions for use    ketorolac 0.5 % ophthalmic solution    ACULAR    5 mL    Place 1 drop Into the left eye 4 times daily       ofloxacin 0.3 % ophthalmic solution    OCUFLOX    1 Bottle    Place 1 drop Into the left eye 4 times daily       * order for DME     1 Units    Roll-A-Bout Walker. Patient can use for up to 2 months       * order for DME     1 Units    Walker       prednisoLONE acetate 1 % ophthalmic susp    PRED FORTE    1 Bottle    Place 1 drop Into the left eye 4 times daily       SIMVASTATIN PO      Take 20 mg by mouth every morning       * Notice:  This list has 2 medication(s) that are the same as other medications prescribed for you. Read the directions carefully, and ask your doctor or other care provider to review them with you.

## 2017-02-23 NOTE — NURSING NOTE
Chief Complaints and History of Present Illnesses   Patient presents with     Post Op (Ophthalmology) Left Eye     1 week post op LE Cataract     HPI    Affected eye(s):  Left   Symptoms:        Duration:  1 week      Do you have eye pain now?:  No      Comments:  1 week post op LE  Ketorolac qid LE  Ocuflox qid LE  Prednisolone qid LE  Tiki RIVERO 1:21 PM February 23, 2017

## 2017-02-23 NOTE — PATIENT INSTRUCTIONS
Stop ofloxacin      Taper prednisolone to three times a day for one week then twice a day for one week then once a day for one week then stop     Taper ketorolac to three times a day for one week then twice a day for one week then once a day for one week then stop

## 2017-02-23 NOTE — PROGRESS NOTES
Postoperative week #1 s/p Cataract extraction/IOL left eye    66 year old high myope    S/p laser in situ keratomileusis (LASIK) both eyes    A/P:    Postoperative week#1 s/p Cataract extraction/IOL left eye    Stop ofloxacin     Taper prednisolone to three times a day for one week then twice a day for one week then once a day for one week then stop    Taper ketorolac to three times a day for one week then twice a day for one week then once a day for one week then stop    F/u 1 month with manifest refraction       ~~~~~~~~~~~~~~~~~~~~~~~~~~~~~~~~~~~~~~~~~~~~~~~~~~~~~~~~~~~~~~~~    I have personally examined the patient and agree with the assessment and plan as delineated by the resident / fellow.  I have confirmed the contents of the chief complaint, history of present illness, review of systems, and medical / surgical history sections and edited the note as needed.    Pramod Britt MD, MA  Director, Cornea & Anterior Segment  [facility] Department of Ophthalmology & Visual Neuroscience

## 2017-02-24 ENCOUNTER — OFFICE VISIT (OUTPATIENT)
Dept: ORTHOPEDICS | Facility: CLINIC | Age: 67
End: 2017-02-24

## 2017-02-24 VITALS — BODY MASS INDEX: 25.04 KG/M2 | WEIGHT: 169.1 LBS | HEIGHT: 69 IN

## 2017-02-24 DIAGNOSIS — M79.672 LEFT FOOT PAIN: Primary | ICD-10-CM

## 2017-02-24 ASSESSMENT — ENCOUNTER SYMPTOMS
BACK PAIN: 0
ARTHRALGIAS: 0
MYALGIAS: 0
STIFFNESS: 0
MUSCLE CRAMPS: 0
MUSCLE WEAKNESS: 0
JOINT SWELLING: 0
NECK PAIN: 0

## 2017-02-24 NOTE — PROGRESS NOTES
This 66-year-old woman suffered a fifth metatarsal injury last year. About 5 weeks ago she had another twisting injury to the foot and had lateral foot pain. She has been using a cam boot for the last 5 weeks with some relief. Recently she tried to do some walking outside of the boot and was quite sore afterwards. The pain is at the base of the left fifth metatarsal but not the tip. It does not seem to radiate. It is an achy pain when experienced. It is activity related.    I reviewed this patient's current x-rays and do not see any sign of fracture Of the fifth metatarsal. I can see her well-healed injury more distal. I compared this to old films.    On examination she is alert and oriented in her usual mood and affect in no acute distress. Her left lower extremity there is no masses edema or erythema in the foot and ankle area. She is able to move her foot and ankle and knee fully. There is some minimaltenderness in the metaphysis proximally of the fifth metatarsal but not elsewhere. This is laterally based tenderness.  Her foot is well-perfused.    We will attempt to  Get a hold of her previous x-rays which by report of the patient showed some sign of a fracture.She may have a stress fracture here but I cannot see any evidence of at this time. I have recommended that she continue with her boot wear but begin to spend more and more time outside of the boot NANCY Salazar.I have offered to take another x-ray of this in 1 month to see if we see any sign of a subtle fracture healing.

## 2017-02-24 NOTE — NURSING NOTE
"Reason For Visit:   Chief Complaint   Patient presents with     RECHECK     Pt. states that she is here today for follow up on left fifth metatarsal fracture. DOI: 04/02/2016. She mention that she had a accident 01/2017 and went to the urgent care to be treated.        Pain Assessment  Patient Currently in Pain: Yes  0-10 Pain Scale: 2  Primary Pain Location: Foot  Pain Orientation: Left  Pain Descriptors: Aching  Alleviating Factors: Rest  Aggravating Factors: Movement, Sitting, Walking, Standing               HEIGHT: 5' 8.5\", WEIGHT: 169 lbs 1.6 oz, BMI: Body mass index is 25.34 kg/(m^2).      Current Outpatient Prescriptions   Medication Sig Dispense Refill     ketorolac (ACULAR) 0.5 % ophthalmic solution Place 1 drop Into the left eye 4 times daily 5 mL 0     ofloxacin (OCUFLOX) 0.3 % ophthalmic solution Place 1 drop Into the left eye 4 times daily 1 Bottle 0     prednisoLONE acetate (PRED FORTE) 1 % ophthalmic susp Place 1 drop Into the left eye 4 times daily 1 Bottle 0     order for DME Walker 1 Units 0     order for DME Roll-A-Bout Walker. Patient can use for up to 2 months 1 Units 0     SIMVASTATIN PO Take 20 mg by mouth every morning           No Known Allergies      "

## 2017-02-24 NOTE — MR AVS SNAPSHOT
After Visit Summary   2017    Marilu Rondon    MRN: [id number]           Patient Information     Date Of Birth          1950        Visit Information        Provider Department      2017 11:15 AM Jeffrey Emerson MD [facility]        Today's Diagnoses     Left foot pain    -  1       Follow-ups after your visit        Follow-up notes from your care team     Return in about 1 month (around 3/24/2017).      Your next 10 appointments already scheduled     Mar 22, 2017  1:00 PM CDT   Post-Op with Bharath Cooper MD   Eye Clinic ([facility])    [address]  [address]  [address]  [facility] [address] [phone]              Who to contact     Please call your clinic at [phone] to:    Ask questions about your health    Make or cancel appointments    Discuss your medicines    Learn about your test results    Speak to your doctor   If you have compliments or concerns about an experience at your clinic, or if you wish to file a complaint, please contact [facility] Patient Relations at [phone] or email us at [email or web address]         Additional Information About Your Visit        MyChart Information     SavingGlobalhart is an electronic gateway that provides easy, online access to your medical records. With Perfect Memory, you can request a clinic appointment, read your test results, renew a prescription or communicate with your care team.     To sign up for D[employer]t visit the website at [email or web address].   You will be asked to enter the access code listed below, as well as some personal information. Please follow the directions to create your username and password.     Your access code is: [email or web address]  Expires: 2017  8:30 AM     Your access code will  in 90 days. If you need help or a new code, please contact your [facility] or call  "[phone] for assistance.        Care EveryWhere ID     This is your Care EveryWhere ID. This could be used by other organizations to access your [place] medical records  [id number]        Your Vitals Were     Height BMI (Body Mass Index)                1.74 m (5' 8.5\") 25.34 kg/m2           Blood Pressure from Last 3 Encounters:   02/15/17 116/73   04/06/16 98/69   04/03/16 110/60    Weight from Last 3 Encounters:   02/24/17 76.7 kg (169 lb 1.6 oz)   01/12/17 76.7 kg (169 lb 3.2 oz)   08/12/16 76.7 kg (169 lb)              Today, you had the following     No orders found for display       Primary Care Provider Office Phone # Fax #    Estefanía Ernandez [phone]       [place] [address]        Thank you!     Thank you for choosing [facility]  for your care. Our goal is always to provide you with excellent care. Hearing back from our patients is one way we can continue to improve our services. Please take a few minutes to complete the written survey that you may receive in the mail after your visit with us. Thank you!             Your Updated Medication List - Protect others around you: Learn how to safely use, store and throw away your medicines at www.disposemymeds.org.          This list is accurate as of: 2/24/17  2:18 PM.  Always use your most recent med list.                   Brand Name Dispense Instructions for use    ketorolac 0.5 % ophthalmic solution    ACULAR    5 mL    Place 1 drop Into the left eye 4 times daily       ofloxacin 0.3 % ophthalmic solution    OCUFLOX    1 Bottle    Place 1 drop Into the left eye 4 times daily       * order for DME     1 Units    Roll-A-Bout Walker. Patient can use for up to 2 months       * order for DME     1 Units    Walker       prednisoLONE acetate 1 % ophthalmic susp    PRED FORTE    1 Bottle    Place 1 drop Into the left eye 4 times daily       SIMVASTATIN PO      Take 20 mg by mouth " every morning       * Notice:  This list has 2 medication(s) that are the same as other medications prescribed for you. Read the directions carefully, and ask your doctor or other care provider to review them with you.

## 2017-02-24 NOTE — LETTER
2/24/2017       RE: Marilu Rondon  [address]   [place] [address]     Dear Colleague,    Thank you for referring your patient, Marilu Rondon, to the [facility] at [facility]. Please see a copy of my visit note below.    This 66-year-old woman suffered a fifth metatarsal injury last year. About 5 weeks ago she had another twisting injury to the foot and had lateral foot pain. She has been using a cam boot for the last 5 weeks with some relief. Recently she tried to do some walking outside of the boot and was quite sore afterwards. The pain is at the base of the left fifth metatarsal but not the tip. It does not seem to radiate. It is an achy pain when experienced. It is activity related.    I reviewed this patient's current x-rays and do not see any sign of fracture Of the fifth metatarsal. I can see her well-healed injury more distal. I compared this to old films.    On examination she is alert and oriented in her usual mood and affect in no acute distress. Her left lower extremity there is no masses edema or erythema in the foot and ankle area. She is able to move her foot and ankle and knee fully. There is some minimaltenderness in the metaphysis proximally of the fifth metatarsal but not elsewhere. This is laterally based tenderness.  Her foot is well-perfused.    We will attempt to  Get a hold of her previous x-rays which by report of the patient showed some sign of a fracture.She may have a stress fracture here but I cannot see any evidence of at this time. I have recommended that she continue with her boot wear but begin to spend more and more time outside of the boot E Martin.I have offered to take another x-ray of this in 1 month to see if we see any sign of a subtle fracture healing.    Jeffrey Emerson MD

## 2017-03-22 ENCOUNTER — OFFICE VISIT (OUTPATIENT)
Dept: OPHTHALMOLOGY | Facility: CLINIC | Age: 67
End: 2017-03-22
Attending: OPHTHALMOLOGY
Payer: COMMERCIAL

## 2017-03-22 DIAGNOSIS — Z48.810 AFTERCARE FOLLOWING SURGERY OF A SENSORY ORGAN: Primary | ICD-10-CM

## 2017-03-22 PROCEDURE — 99213 OFFICE O/P EST LOW 20 MIN: CPT | Mod: ZF

## 2017-03-22 PROCEDURE — 92015 DETERMINE REFRACTIVE STATE: CPT | Mod: ZF

## 2017-03-22 ASSESSMENT — REFRACTION_MANIFEST
OS_AXIS: 090
OD_CYLINDER: +0.50
OS_SPHERE: -2.25
OD_ADD: +2.50
OD_SPHERE: -3.00
OD_AXIS: 170
OS_ADD: +2.50
OS_CYLINDER: +1.50

## 2017-03-22 ASSESSMENT — SLIT LAMP EXAM - LIDS
COMMENTS: NORMAL
COMMENTS: NORMAL

## 2017-03-22 ASSESSMENT — VISUAL ACUITY
OD_CC: 20/50
OS_SC: 20/50
OD_CC+: +1
OS_PH_SC: 20/40
CORRECTION_TYPE: GLASSES
METHOD: SNELLEN - LINEAR

## 2017-03-22 ASSESSMENT — EXTERNAL EXAM - LEFT EYE: OS_EXAM: NORMAL

## 2017-03-22 ASSESSMENT — EXTERNAL EXAM - RIGHT EYE: OD_EXAM: NORMAL

## 2017-03-22 ASSESSMENT — CUP TO DISC RATIO: OS_RATIO: 0.2

## 2017-03-22 ASSESSMENT — CONF VISUAL FIELD
OD_NORMAL: 1
OS_NORMAL: 1
METHOD: COUNTING FINGERS

## 2017-03-22 ASSESSMENT — TONOMETRY
OD_IOP_MMHG: 12
OS_IOP_MMHG: 13
IOP_METHOD: TONOPEN

## 2017-03-22 NOTE — PROGRESS NOTES
Postoperative week #4 s/p Cataract extraction/IOL left eye    66 year old high myope    S/p laser in situ keratomileusis (LASIK) both eyes    A/P:    Postoperative week#4 s/p Cataract extraction/IOL left eye    MRx  F/u Retina 6 months for myopic degeneration both eyes    ~~~~~~~~~~~~~~~~~~~~~~~~~~~~~~~~~~~~~~~~~~~~~~~~~~~~~~~~~~~~~~~~    Complete documentation of historical and exam elements from today's encounter can be found in the full encounter summary report (not reduplicated in this progress note). I personally obtained the chief complaint(s) and history of present illness.  I confirmed and edited as necessary the review of systems, past medical/surgical history, family history, social history, and examination findings as documented by others; and I examined the patient myself. I personally reviewed the relevant tests, images, and reports as documented above. I formulated and edited as necessary the assessment and plan and discussed the findings and management plan with the patient and family.    Bharath Cooper MD

## 2017-03-22 NOTE — MR AVS SNAPSHOT
After Visit Summary   3/22/2017    Marilu Rondon    MRN: [id number]           Patient Information     Date Of Birth          1950        Visit Information        Provider Department      3/22/2017 1:00 PM Bharath Cooper MD Eye Clinic        Today's Diagnoses     Aftercare following surgery of a sensory organ    -  1       Follow-ups after your visit        Your next 10 appointments already scheduled     2017 10:00 AM CDT   NEW RETINA with Tiffany Gonzalez MD   Eye Clinic ([facility])    [address]  [address]  [address]  [facility] [address]   [phone]              Who to contact     Please call your clinic at [phone] to:    Ask questions about your health    Make or cancel appointments    Discuss your medicines    Learn about your test results    Speak to your doctor   If you have compliments or concerns about an experience at your clinic, or if you wish to file a complaint, please contact [facility] Patient Relations at [phone] or email us at [email or web address]         Additional Information About Your Visit        MyChart Information     uControlt is an electronic gateway that provides easy, online access to your medical records. With Beetle Beats, you can request a clinic appointment, read your test results, renew a prescription or communicate with your care team.     To sign up for Beetle Beats visit the website at [email or web address]   You will be asked to enter the access code listed below, as well as some personal information. Please follow the directions to create your username and password.     Your access code is: [email or web address]  Expires: 2017  6:25 PM     Your access code will  in 90 days. If you need help or a new code, please contact your [facility] Clinic or call [phone] for assistance.        Care EveryWhere ID     This is your Care  EveryWhere ID. This could be used by other organizations to access your [place] medical records  [id number]         Blood Pressure from Last 3 Encounters:   02/15/17 116/73   04/06/16 98/69   04/03/16 110/60    Weight from Last 3 Encounters:   02/24/17 76.7 kg (169 lb 1.6 oz)   01/12/17 76.7 kg (169 lb 3.2 oz)   08/12/16 76.7 kg (169 lb)              Today, you had the following     No orders found for display       Primary Care Provider Office Phone # Fax #    Estefanía Ernandez [phone]       [place] [id number]        Thank you!     Thank you for choosing EYE CLINIC  for your care. Our goal is always to provide you with excellent care. Hearing back from our patients is one way we can continue to improve our services. Please take a few minutes to complete the written survey that you may receive in the mail after your visit with us. Thank you!             Your Updated Medication List - Protect others around you: Learn how to safely use, store and throw away your medicines at www.disposemymeds.org.          This list is accurate as of: 3/22/17 11:59 PM.  Always use your most recent med list.                   Brand Name Dispense Instructions for use    * order for DME     1 Units    Roll-A-Bout Walker. Patient can use for up to 2 months       * order for DME     1 Units    Walker       SIMVASTATIN PO      Take 20 mg by mouth every morning       * Notice:  This list has 2 medication(s) that are the same as other medications prescribed for you. Read the directions carefully, and ask your doctor or other care provider to review them with you.

## 2017-03-22 NOTE — NURSING NOTE
Chief Complaints and History of Present Illnesses   Patient presents with     Post Op (Ophthalmology) Left Eye     5 week post op s/p Cataract extraction/IOL left eye 2/15/17     HPI    Affected eye(s):  Left   Symptoms:     (Comment: Vision is unchanged RE, Imporved LE.)   Floaters (Comment: RE for last 2.5 years, unchanged)   No flashes   No redness   No tearing   No itching         Do you have eye pain now?:  No      Comments:  5 week post op s/p Cataract extraction/IOL left eye 2/15/17.    Vaughn RIVERO  12:39 PM03/22/2017

## 2017-04-18 DIAGNOSIS — H44.23 MYOPIC DEGENERATION, BILATERAL: Primary | ICD-10-CM

## 2017-04-27 ENCOUNTER — OFFICE VISIT (OUTPATIENT)
Dept: OPHTHALMOLOGY | Facility: CLINIC | Age: 67
End: 2017-04-27
Attending: OPHTHALMOLOGY
Payer: COMMERCIAL

## 2017-04-27 DIAGNOSIS — H44.23 MYOPIC DEGENERATION, BILATERAL: ICD-10-CM

## 2017-04-27 PROCEDURE — 92134 CPTRZ OPH DX IMG PST SGM RTA: CPT | Mod: ZF | Performed by: OPHTHALMOLOGY

## 2017-04-27 PROCEDURE — 99214 OFFICE O/P EST MOD 30 MIN: CPT | Mod: 25,ZF

## 2017-04-27 ASSESSMENT — SLIT LAMP EXAM - LIDS
COMMENTS: NORMAL
COMMENTS: NORMAL

## 2017-04-27 ASSESSMENT — REFRACTION_WEARINGRX
OS_ADD: +2.50
OD_SPHERE: -3.00
OS_CYLINDER: +1.50
OD_ADD: +2.50
OS_AXIS: 090
OD_AXIS: 170
OS_SPHERE: -2.25
OD_CYLINDER: +0.50

## 2017-04-27 ASSESSMENT — REFRACTION_MANIFEST
OS_AXIS: 090
OS_SPHERE: -2.50
OD_SPHERE: -3.00
OD_CYLINDER: +0.50
OD_ADD: +2.75
OD_AXIS: 170
OS_CYLINDER: +1.50
OS_ADD: +2.75

## 2017-04-27 ASSESSMENT — EXTERNAL EXAM - RIGHT EYE: OD_EXAM: NORMAL

## 2017-04-27 ASSESSMENT — VISUAL ACUITY
CORRECTION_TYPE: GLASSES
OD_CC+: +2
OS_CC: 20/30
OD_CC: 20/50
OS_CC+: -2
METHOD: SNELLEN - LINEAR

## 2017-04-27 ASSESSMENT — CUP TO DISC RATIO
OS_RATIO: 0.2
OD_RATIO: 0.2

## 2017-04-27 ASSESSMENT — CONF VISUAL FIELD
OS_NORMAL: 1
OD_NORMAL: 1

## 2017-04-27 ASSESSMENT — EXTERNAL EXAM - LEFT EYE: OS_EXAM: NORMAL

## 2017-04-27 ASSESSMENT — TONOMETRY
IOP_METHOD: TONOPEN
OS_IOP_MMHG: 15
OD_IOP_MMHG: 15

## 2017-04-27 NOTE — NURSING NOTE
Chief Complaints and History of Present Illnesses   Patient presents with     New Patient     myopic degeneration both eyes     HPI    Affected eye(s):  Both   Symptoms:     Floaters   No flashes         Do you have eye pain now?:  No      Comments:  New patient is here for myopic degeneration both eyes evaluation.  The patient notes she notices her floaters since cataract surgery.    JOSEFA Toscano 10:22 AM 04/27/2017

## 2017-04-27 NOTE — MR AVS SNAPSHOT
After Visit Summary   2017    Marilu Rondon    MRN: [id number]           Patient Information     Date Of Birth          1950        Visit Information        Provider Department      2017 10:00 AM Tiffany Gonzalez MD Eye Clinic        Today's Diagnoses     Myopic degeneration, bilateral           Follow-ups after your visit        Follow-up notes from your care team     Return in about 1 year (around 2018) for OCT.      Who to contact     Please call your clinic at [phone] to:    Ask questions about your health    Make or cancel appointments    Discuss your medicines    Learn about your test results    Speak to your doctor   If you have compliments or concerns about an experience at your clinic, or if you wish to file a complaint, please contact [facility] Patient Relations at [phone] or email us at [email or web address]         Additional Information About Your Visit        MyChart Information     Impevat is an electronic gateway that provides easy, online access to your medical records. With Vocus Communications, you can request a clinic appointment, read your test results, renew a prescription or communicate with your care team.     To sign up for Impevat visit the website at [email or web address]   You will be asked to enter the access code listed below, as well as some personal information. Please follow the directions to create your username and password.     Your access code is: [email or web address]  Expires: 2017  6:25 PM     Your access code will  in 90 days. If you need help or a new code, please contact your [facility] or call [phone] for assistance.        Care EveryWhere ID     This is your Care EveryWhere ID. This could be used by other organizations to access your [place] medical records  [id number]         Blood Pressure from Last 3 Encounters:   02/15/17 116/73   16  98/69   04/03/16 110/60    Weight from Last 3 Encounters:   02/24/17 76.7 kg (169 lb 1.6 oz)   01/12/17 76.7 kg (169 lb 3.2 oz)   08/12/16 76.7 kg (169 lb)              We Performed the Following     OCT Retina Spectralis OU (both eyes)        Primary Care Provider Office Phone # Fax #    Estefanía Ernandez [phone]       [address] [address]        Thank you!     Thank you for choosing EYE CLINIC  for your care. Our goal is always to provide you with excellent care. Hearing back from our patients is one way we can continue to improve our services. Please take a few minutes to complete the written survey that you may receive in the mail after your visit with us. Thank you!             Your Updated Medication List - Protect others around you: Learn how to safely use, store and throw away your medicines at www.disposemymeds.org.          This list is accurate as of: 4/27/17  2:32 PM.  Always use your most recent med list.                   Brand Name Dispense Instructions for use    * order for DME     1 Units    Roll-A-Bout Walker. Patient can use for up to 2 months       * order for DME     1 Units    Walker       SIMVASTATIN PO      Take 20 mg by mouth every morning       * Notice:  This list has 2 medication(s) that are the same as other medications prescribed for you. Read the directions carefully, and ask your doctor or other care provider to review them with you.

## 2017-04-27 NOTE — PROGRESS NOTES
CC: floaters both eyes   HPI: Marilu Rondon is a  66 year old year-old patient referred by Dr Britt for myopic degeneration evaluation and a lamellar macular hole right eye.  Reports no changes in vision no flashes and floaters     Retinal Imaging:  OCT  4-27-17  RE: schisis and partial thickness hole; no subretinal fluid- stable Optical Coherence Tomography   LE: good foveal contour     Assessment & Plan:    1. History of high myopia both eyes  With  myopic degeneration  VA stable and stable Optical Coherence Tomography  Retina detachment precautions were discussed with the patient (presence or increased in flashes, floaters or a curtain in the visual field) and was asked to return if any of the those occur    2. 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 Cataract extraction intraocular lens both eyes   Right eye:  4/2014   Left eye: 2.15.17 Dr. Britt    4. Posterior vitreous detachment (PVD) both eyes   Patient bother by floaters right eye- observe for now and Retinal detachment  precautions    5. History of laser in situ keratomileusis (LASIK) surgery both eyes 20 ys ago  Stable     Follow up in one year sooner as needed      ~~~~~~~~~~~~~~~~~~~~~~~~~~~~~~~~~~  Complete documentation of historical and exam elements from today's encounter can be found in the full encounter summary report (not redupilcated in this progress note).  I personally obtained the chief complaint(s) and hisotry of present illness., I have confirmed and edited as necessary the Past medical history/Past Surgical History, Social history, Family medical history,  Review of systems, and exam/neuro findings as obtained by the technician or others. I have examined this patient myself. , I personally viewed the relevant tests, image(s), reports, and studies listed above and the documentation reflects my findings and interpretation. and I  formulated and edited as necessary the assessment and plan and discussed the findings and management plan with the patient and family.    Tiffany Gonzalez MD  .  Retina Service   Department of Ophthalmology and Visual Neurosciences   [facility]  Phone: [phone]   Fax: [phone]

## 2018-01-29 ENCOUNTER — TRANSFERRED RECORDS (OUTPATIENT)
Dept: HEALTH INFORMATION MANAGEMENT | Facility: CLINIC | Age: 68
End: 2018-01-29

## 2018-01-29 ENCOUNTER — MEDICAL CORRESPONDENCE (OUTPATIENT)
Dept: HEALTH INFORMATION MANAGEMENT | Facility: CLINIC | Age: 68
End: 2018-01-29

## 2018-03-06 ENCOUNTER — TELEPHONE (OUTPATIENT)
Dept: GASTROENTEROLOGY | Facility: CLINIC | Age: 68
End: 2018-03-06

## 2018-04-10 ENCOUNTER — TELEPHONE (OUTPATIENT)
Dept: GASTROENTEROLOGY | Facility: CLINIC | Age: 68
End: 2018-04-10

## 2018-04-10 ENCOUNTER — RADIANT APPOINTMENT (OUTPATIENT)
Dept: MAMMOGRAPHY | Facility: CLINIC | Age: 68
End: 2018-04-10
Attending: FAMILY MEDICINE
Payer: COMMERCIAL

## 2018-04-10 DIAGNOSIS — Z12.11 SPECIAL SCREENING FOR MALIGNANT NEOPLASMS, COLON: Primary | ICD-10-CM

## 2018-04-10 DIAGNOSIS — Z12.31 VISIT FOR SCREENING MAMMOGRAM: ICD-10-CM

## 2018-04-10 NOTE — TELEPHONE ENCOUNTER
Patient scheduled for Colonoscopy     Indication for procedure. screening    Referring Provider. Dr. Juliette Diaz     ? no    Arrival time verified? 945    Facility location verified? [address] [facility], 5th floor     Instructions given regarding prep and procedure    Prep Type go lytely     Are you taking any anticoagulants or blood thinners? Take asa- will hold for 5 days     Instructions given? yes    Electronic implanted devices? No      Pre procedure teaching completed? Yes    Transportation from procedure? [place]    H&P / Pre op physical completed? Na

## 2018-04-18 ENCOUNTER — SURGERY (OUTPATIENT)
Age: 68
End: 2018-04-18

## 2018-04-18 ENCOUNTER — HOSPITAL ENCOUNTER (OUTPATIENT)
Facility: AMBULATORY SURGERY CENTER | Age: 68
End: 2018-04-18
Attending: INTERNAL MEDICINE
Payer: COMMERCIAL

## 2018-04-18 VITALS
TEMPERATURE: 97.8 F | DIASTOLIC BLOOD PRESSURE: 67 MMHG | OXYGEN SATURATION: 94 % | SYSTOLIC BLOOD PRESSURE: 103 MMHG | HEART RATE: 74 BPM | BODY MASS INDEX: 24.25 KG/M2 | HEIGHT: 68 IN | RESPIRATION RATE: 16 BRPM | WEIGHT: 160 LBS

## 2018-04-18 LAB — COLONOSCOPY: NORMAL

## 2018-04-18 RX ORDER — LIDOCAINE 40 MG/G
CREAM TOPICAL
Status: DISCONTINUED | OUTPATIENT
Start: 2018-04-18 | End: 2018-04-19 | Stop reason: HOSPADM

## 2018-04-18 RX ORDER — ONDANSETRON 2 MG/ML
4 INJECTION INTRAMUSCULAR; INTRAVENOUS
Status: DISCONTINUED | OUTPATIENT
Start: 2018-04-18 | End: 2018-04-19 | Stop reason: HOSPADM

## 2018-04-18 RX ORDER — SIMETHICONE
LIQUID (ML) MISCELLANEOUS PRN
Status: DISCONTINUED | OUTPATIENT
Start: 2018-04-18 | End: 2018-04-18 | Stop reason: HOSPADM

## 2018-04-18 RX ORDER — ASPIRIN 81 MG/1
81 TABLET, CHEWABLE ORAL DAILY
COMMUNITY
End: 2021-09-08

## 2018-04-18 RX ORDER — FENTANYL CITRATE 50 UG/ML
INJECTION, SOLUTION INTRAMUSCULAR; INTRAVENOUS PRN
Status: DISCONTINUED | OUTPATIENT
Start: 2018-04-18 | End: 2018-04-18 | Stop reason: HOSPADM

## 2018-04-18 RX ADMIN — Medication 2 ML: at 09:56

## 2018-04-18 RX ADMIN — FENTANYL CITRATE 50 MCG: 50 INJECTION, SOLUTION INTRAMUSCULAR; INTRAVENOUS at 11:35

## 2018-04-18 RX ADMIN — Medication 250 ML: at 10:30

## 2018-04-18 RX ADMIN — FENTANYL CITRATE 50 MCG: 50 INJECTION, SOLUTION INTRAMUSCULAR; INTRAVENOUS at 12:06

## 2018-04-18 NOTE — OR NURSING
Colonoscopy under conscious sedation wearing 2lpm 02 via NC.  One polyp removed with hot snare, ERBI settings 2/25.  Skin intact after grounding pad removed.

## 2018-04-18 NOTE — IP AVS SNAPSHOT
"                  MRN:[id number]                      After Visit Summary   4/18/2018    Marilu Rondon    MRN: [id number]           Thank you!     Thank you for choosing [facility] for your care. Our goal is always to provide you with excellent care. Hearing back from our patients is one way we can continue to improve our services. Please take a few minutes to complete the written survey that you may receive in the mail after you visit with us. Thank you!        Patient Information     Date Of Birth          1950        About your hospital stay     You were admitted on:  April 18, 2018 You last received care in the:  [facility]    You were discharged on:  April 18, 2018       Who to Call     For medical emergencies, please call 911.  For non-urgent questions about your medical care, please call your primary care provider or clinic, [phone]  For questions related to your surgery, please call your surgery clinic        Attending Provider     Provider Specialty    Pari Garcia MD INTERNAL MEDICINE, HEPATOLOGY       Primary Care Provider Office Phone # Fax #    Estefanía Ernandez [phone]      Pending Results     No orders found from 4/16/2018 to 4/19/2018.            Admission Information     Date & Time Provider Department Dept. Phone    4/18/2018 Pari Garcia MD [facility] [phone]      Your Vitals Were     Blood Pressure Pulse Temperature Respirations Height Weight    86/60 70 97.5  F (36.4  C) (Oral) 13 1.727 m (5' 8\") 72.6 kg (160 lb)    Pulse Oximetry BMI (Body Mass Index)                98% 24.33 kg/m2          Instantis Information     Instantis is an electronic gateway that provides easy, online access to your medical records. With Instantis, you can request a clinic appointment, read your test results, renew a prescription or communicate with your care team.     To sign up for Instantis visit the website at " [email or web address]   You will be asked to enter the access code listed below, as well as some personal information. Please follow the directions to create your username and password.     Your access code is: [email or web address]  Expires: 2018  6:31 AM     Your access code will  in 90 days. If you need help or a new code, please contact your [facility] or call [phone] for assistance.        Care EveryWhere ID     This is your Care EveryWhere ID. This could be used by other organizations to access your [place] medical records  [id number]        Equal Access to Services     [facility] PRESTON : Hadisabel Reed, shaun murphy, rosana sheralmajerome billings, rosibel fernandez . So [facility] [phone].    ATENCIÓN: Si habla español, tiene a lerma disposición servicios gratuitos de asistencia lingüística. [facility] [phone].    We comply with applicable federal civil rights laws and [place] laws. We do not discriminate on the basis of race, color, national origin, age, disability, sex, sexual orientation, or gender identity.               Review of your medicines      UNREVIEWED medicines. Ask your doctor about these medicines        Dose / Directions    aspirin 81 MG chewable tablet        Dose:  81 mg   Take 81 mg by mouth daily   Refills:  0       SIMVASTATIN PO        Dose:  20 mg   Take 20 mg by mouth every morning   Refills:  0         CONTINUE these medicines which have NOT CHANGED        Dose / Directions    * order for DME   Used for:  Closed displaced fracture of fifth metatarsal bone of left foot, initial encounter        Roll-A-Bout Walker. Patient can use for up to 2 months   Quantity:  1 Units   Refills:  0       * order for DME   Used for:  Closed displaced fracture of fifth metatarsal bone of left foot with routine healing, subsequent encounter        Walker   Quantity:  1 Units   Refills:  0       * Notice:  This list has 2  medication(s) that are the same as other medications prescribed for you. Read the directions carefully, and ask your doctor or other care provider to review them with you.             Protect others around you: Learn how to safely use, store and throw away your medicines at www.disposemymeds.org.             Medication List: This is a list of all your medications and when to take them. Check marks below indicate your daily home schedule. Keep this list as a reference.      Medications           Morning Afternoon Evening Bedtime As Needed    aspirin 81 MG chewable tablet   Take 81 mg by mouth daily                                * order for DME   Roll-A-Bout Walker. Patient can use for up to 2 months                                * order for DME   Walker                                SIMVASTATIN PO   Take 20 mg by mouth every morning                                * Notice:  This list has 2 medication(s) that are the same as other medications prescribed for you. Read the directions carefully, and ask your doctor or other care provider to review them with you.

## 2018-04-18 NOTE — IP AVS SNAPSHOT
[facility]    [address] [place] [id number]    Phone:  [phone]    Fax:  [phone]                                       After Visit Summary   4/18/2018    Marilu Rondon    MRN: [id number]           After Visit Summary Signature Page     I have received my discharge instructions, and my questions have been answered. I have discussed any challenges I see with this plan with the nurse or doctor.    ..........................................................................................................................................  Patient/Patient Representative Signature      ..........................................................................................................................................  Patient Representative Print Name and Relationship to Patient    ..................................................               ................................................  Date                                            Time    ..........................................................................................................................................  Reviewed by Signature/Title    ...................................................              ..............................................  Date                                                            Time

## 2018-04-18 NOTE — LETTER
"    Patient:  Marilu oRndon  :   1950  MRN:     [id number]        Ms.Deborah LISA Rondon  [address]   [place] [address]        2018    Dear ,    We are writing to inform you of your test results.    The polyp is tubular adenoma,  which is what we expected. There are no signs of cancer or other concerning features. The plan remains the same: repeat the colonoscopy in 5 years.      It has been a pleasure participating in your care.  Please feel free to contact our clinic with any further questions.      Sincerely,    David Garcia MD    Resulted Orders   Surgical pathology exam   Result Value Ref Range    Copath Report       Patient Name: MARILU RONDON  MR#: [id number]  Specimen #: [id number]  Collected: 2018  Received: 2018  Reported: 2018 11:20  Ordering Phy(s): DAVID GARCIA    For improved result formatting, select 'View Enhanced Report Format' under   Linked Documents section.    SPECIMEN(S):  Rectal polyp    FINAL DIAGNOSIS:  Large intestine, rectum, polypectomy  - Tubular adenoma  - No evidence of high-grade dysplasia or invasive malignancy    I have personally reviewed all specimens and/or slides, including the   listed special stains, and used them  with my medical judgement to determine or confirm the final diagnosis.    Electronically signed out by:    Gareth Forrest M.D., [facility]    CLINICAL HISTORY:  Screening  GROSS:  The specimen is received in formalin with proper patient identification,   labeled \"rectal polyp\", and consists  of a 0.9 x 0.8 x 0.6 cm pink-red, finely lobulated polyp.  The margin is   inked blue, and the specimen is  trisected, wrapped, and entirely s ubmitted in cassette A1. (Dictated by:   Lucinda De Santiago 2018 01:33 PM)    MICROSCOPIC:  A formal microscopic examination was performed.    CPT Codes:  A: 66124-JR2    TESTING LAB LOCATION:  [facility], [facility] [address] [place]   [id number] [phone]    COLLECTION SITE:  Client: [facility] [place]  Location: [facility])

## 2018-04-19 LAB — COPATH REPORT: NORMAL

## 2018-10-31 ENCOUNTER — OFFICE VISIT (OUTPATIENT)
Dept: OPHTHALMOLOGY | Facility: CLINIC | Age: 68
End: 2018-10-31
Attending: OPHTHALMOLOGY
Payer: COMMERCIAL

## 2018-10-31 DIAGNOSIS — H35.341 PARTIAL THICKNESS MACULAR HOLE OF RIGHT EYE: Primary | ICD-10-CM

## 2018-10-31 DIAGNOSIS — H44.23 DEGENERATIVE MYOPIA OF BOTH EYES: Primary | ICD-10-CM

## 2018-10-31 DIAGNOSIS — H44.23 DEGENERATIVE MYOPIA OF BOTH EYES: ICD-10-CM

## 2018-10-31 PROCEDURE — 92134 CPTRZ OPH DX IMG PST SGM RTA: CPT | Mod: ZF | Performed by: OPHTHALMOLOGY

## 2018-10-31 PROCEDURE — G0463 HOSPITAL OUTPT CLINIC VISIT: HCPCS | Mod: ZF

## 2018-10-31 PROCEDURE — 92015 DETERMINE REFRACTIVE STATE: CPT | Mod: ZF

## 2018-10-31 ASSESSMENT — REFRACTION_WEARINGRX
OD_CYLINDER: +0.50
OS_CYLINDER: +1.50
OD_AXIS: 170
OD_SPHERE: -3.00
OD_ADD: +2.50
OS_SPHERE: -2.50
OS_ADD: +2.50
OS_AXIS: 090

## 2018-10-31 ASSESSMENT — VISUAL ACUITY
OD_PH_CC: 20/50
OD_CC: 20/60
CORRECTION_TYPE: GLASSES
METHOD: SNELLEN - LINEAR
OS_PH_CC: 20/40
OS_CC: 20/60

## 2018-10-31 ASSESSMENT — REFRACTION_MANIFEST
OD_ADD: +2.75
OS_ADD: +2.75
OS_SPHERE: -3.50
OS_AXIS: 090
OD_AXIS: 010
OD_CYLINDER: +0.50
OS_CYLINDER: +1.50
OD_SPHERE: -3.50

## 2018-10-31 ASSESSMENT — CUP TO DISC RATIO
OD_RATIO: 0.2
OS_RATIO: 0.2

## 2018-10-31 ASSESSMENT — CONF VISUAL FIELD: OD_INFERIOR_NASAL_RESTRICTION: 3

## 2018-10-31 ASSESSMENT — TONOMETRY
IOP_METHOD: TONOPEN
OD_IOP_MMHG: 20
OS_IOP_MMHG: 19

## 2018-10-31 ASSESSMENT — SLIT LAMP EXAM - LIDS
COMMENTS: NORMAL
COMMENTS: NORMAL

## 2018-10-31 ASSESSMENT — EXTERNAL EXAM - LEFT EYE: OS_EXAM: NORMAL

## 2018-10-31 ASSESSMENT — EXTERNAL EXAM - RIGHT EYE: OD_EXAM: NORMAL

## 2018-10-31 NOTE — MR AVS SNAPSHOT
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      Who to contact     Please call your clinic at [phone] to:    Ask questions about your health    Make or cancel appointments    Discuss your medicines    Learn about your test results    Speak to your doctor            Additional Information About Your Visit        MyChart Information     [employer] is an electronic gateway that provides easy, online access to your medical records. With P[employer], you can request a clinic appointment, read your test results, renew a prescription or communicate with your care team.     To sign up for [employer] visit the website at [email or web address]   You will be asked to enter the access code listed below, as well as some personal information. Please follow the directions to create your username and password.     Your access code is: [email or web address]  Expires: 2019  6:31 AM     Your access code will  in 90 days. If you need help or a new code, please contact your [facility] or call [phone] for assistance.        Care EveryWhere ID     This is your Care EveryWhere ID. This could be used by other organizations to access your [place] medical records  [id number]         Blood Pressure from Last 3 Encounters:   18 103/67   02/15/17 116/73   16 98/69    Weight from Last 3 Encounters:   18 72.6 kg (160 lb)   17 76.7 kg (169 lb 1.6 oz)   17 76.7 kg (169 lb 3.2 oz)              We Performed the  Following     OCT Retina Spectralis OU (both eyes)        Primary Care Provider Office Phone # Fax #    Estefanía Ernandez [phone]       [address] [address]        Equal Access to Services     [facility] JOHNSTON : Hadisabel giovanni sousa davio Soomaali, waaxda luqadaha, qaybta kaalmada adecarlosyada, rosibel hardwick adeliacarlos bobo jim jimenez. So [facility] [phone].    ATENCIÓN: Si habla español, tiene a lerma disposición servicios gratuitos de asistencia lingüística. Llame al [phone].    We comply with applicable federal civil rights laws and [place] laws. We do not discriminate on the basis of race, color, national origin, age, disability, sex, sexual orientation, or gender identity.            Thank you!     Thank you for choosing EYE CLINIC  for your care. Our goal is always to provide you with excellent care. Hearing back from our patients is one way we can continue to improve our services. Please take a few minutes to complete the written survey that you may receive in the mail after your visit with us. Thank you!             Your Updated Medication List - Protect others around you: Learn how to safely use, store and throw away your medicines at www.disposemymeds.org.          This list is accurate as of 10/31/18  3:50 PM.  Always use your most recent med list.                   Brand Name Dispense Instructions for use Diagnosis    aspirin 81 MG chewable tablet      Take 81 mg by mouth daily        * order for DME     1 Units    Roll-A-Bout Walker. Patient can use for up to 2 months    Closed displaced fracture of fifth metatarsal bone of left foot, initial encounter       * order for DME     1 Units    Walker    Closed displaced fracture of fifth metatarsal bone of left foot with routine healing, subsequent encounter       SIMVASTATIN PO      Take 20 mg by mouth every morning        * Notice:  This list has 2 medication(s) that are the same as other medications  prescribed for you. Read the directions carefully, and ask your doctor or other care provider to review them with you.

## 2018-10-31 NOTE — PROGRESS NOTES
CC: floaters both eyes   HPI: Marilu Rondon is a  68 year old year-old patient referred by Dr Britt for myopic degeneration evaluation and a lamellar macular hole right eye.  Reports no changes in vision no flashes and floaters     Retinal Imaging:  OCT  10-31-18  RE: schisis and partial thickness hole; no subretinal fluid- stable Optical Coherence Tomography   LE: good foveal contour     Assessment & Plan:    1. History of high myopia both eyes  With  myopic degeneration  VA stable and stable Optical Coherence Tomography  Retina detachment precautions were discussed with the patient (presence or increased in flashes, floaters or a curtain in the visual field) and was asked to return if any of the those occur    2. 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 Cataract extraction intraocular lens both eyes   Right eye:  4/2014   Left eye: 2.15.17 Dr. Britt    4. Posterior vitreous detachment (PVD) both eyes   Patient bother by floaters right eye- observe for now and Retinal detachment  precautions    5. History of laser in situ keratomileusis (LASIK) surgery both eyes 20 ys ago  Stable     Follow up in one year sooner as needed      ~~~~~~~~~~~~~~~~~~~~~~~~~~~~~~~~~~   Complete documentation of historical and exam elements from today's encounter can be found in the full encounter summary report (not reduplicated in this progress note).  I personally obtained the chief complaint(s) and history of present illness.  I confirmed and edited as necessary the review of systems, past medical/surgical history, family history, social history, and examination findings as documented by others; and I examined the patient myself.  I personally reviewed the relevant tests, images, and reports as documented above.  I personally reviewed the ophthalmic test(s) associated with this encounter, agree with the interpretation(s) as  documented by the resident/fellow, and have edited the corresponding report(s) as necessary.   I formulated and edited as necessary the assessment and plan and discussed the findings and management plan with the patient and family    Tiffany Gonzalez MD  .  Retina Service   Department of Ophthalmology and Visual Neurosciences   [facility]  Phone: [phone]   Fax: [phone]

## 2018-10-31 NOTE — NURSING NOTE
Chief Complaints and History of Present Illnesses   Patient presents with     Yearly Exam     routine exam     HPI    Affected eye(s):  Both   Symptoms:     Floaters   No flashes   No glare   No halos         Do you have eye pain now?:  No      Comments:  Routine exam  Want new glasses  Tiki RIVERO 2:51 PM October 31, 2018

## 2019-02-05 ENCOUNTER — TELEPHONE (OUTPATIENT)
Dept: OPHTHALMOLOGY | Facility: CLINIC | Age: 69
End: 2019-02-05

## 2019-02-05 NOTE — TELEPHONE ENCOUNTER
Last glasses Rx mailed per request 2-5-19  Jesse Holt RN 12:59 PM 02/05/19            [facility] Call Center    Phone Message    May a detailed message be left on voicemail: yes    Reason for Call: Other: Pt is requesting for her most recent glasses Rx script to be mailed to her home address on file. Please assist.     Action Taken: Message routed to:  [facility] ([facility]

## 2019-04-05 ENCOUNTER — MEDICAL CORRESPONDENCE (OUTPATIENT)
Dept: HEALTH INFORMATION MANAGEMENT | Facility: CLINIC | Age: 69
End: 2019-04-05

## 2019-04-05 ENCOUNTER — TRANSFERRED RECORDS (OUTPATIENT)
Dept: HEALTH INFORMATION MANAGEMENT | Facility: CLINIC | Age: 69
End: 2019-04-05

## 2019-04-11 ENCOUNTER — TRANSFERRED RECORDS (OUTPATIENT)
Dept: HEALTH INFORMATION MANAGEMENT | Facility: CLINIC | Age: 69
End: 2019-04-11

## 2019-04-15 ENCOUNTER — ANCILLARY PROCEDURE (OUTPATIENT)
Dept: BONE DENSITY | Facility: CLINIC | Age: 69
End: 2019-04-15
Attending: FAMILY MEDICINE
Payer: COMMERCIAL

## 2019-04-15 DIAGNOSIS — Z00.00 ENCOUNTER FOR GENERAL ADULT MEDICAL EXAMINATION WITHOUT ABNORMAL FINDINGS: ICD-10-CM

## 2019-04-18 ENCOUNTER — OFFICE VISIT (OUTPATIENT)
Dept: AUDIOLOGY | Facility: CLINIC | Age: 69
End: 2019-04-18
Payer: COMMERCIAL

## 2019-04-18 DIAGNOSIS — H90.3 SENSORINEURAL HEARING LOSS, BILATERAL: Primary | ICD-10-CM

## 2019-04-18 NOTE — PROGRESS NOTES
AUDIOLOGY REPORT    SUBJECTIVE:  Marilu Rondon is a 68 year old female who was seen in Audiology at the [facility], [facility] [place] for audiologic evaluation, referred by [facility].  The patient reports concerns with hearing, worse in the right ear. Denies pain, tinnitus, dizziness, noise exposure or ear surgeries.    OBJECTIVE:  Fall Risk Screen:  1. Have you fallen two or more times in the past year? No  2. Have you fallen and had an injury in the past year? No    Otoscopic exam indicates ears are clear of cerumen bilaterally     Pure Tone Thresholds assessed using conventional audiometry with good  reliability from 250-8000 Hz bilaterally using insert earphones and circumaural headphones     RIGHT:  Normal sloping to moderate rising to mild sensorineural hearing loss    LEFT:    Normal sloping to moderate rising to mild sensorineural hearing loss    Tympanogram:    RIGHT: normal eardrum mobility    LEFT:   normal eardrum mobility    Reflexes (reported by stimulus ear):  RIGHT: Ipsilateral is present at normal levels  RIGHT: Contralateral is present at normal levels  LEFT:   Ipsilateral is present at normal levels  LEFT:   Contralateral is present at normal levels      Speech Reception Threshold:    RIGHT: 40 dB HL    LEFT:   45 dB HL  Word Recognition Score:     RIGHT: 96% at 80 dB HL using NU-6 recorded word list.    LEFT:   92% at 80 dB HL using NU-6 recorded word list.      ASSESSMENT:   Today s results were discussed with the patient in detail.     PLAN:  Patient was counseled regarding hearing loss and impact on communication.  Patient is a good candidate for amplification at this time. It is recommended that the patient return for a hearing aid consultation and to monitor hearing annually, sooner per concern.  Please call this clinic with questions regarding these results or recommendations.        Arabella Eduardo, [facility]  Licensed Audiologist  [place] License  #[id number]

## 2019-04-19 ENCOUNTER — OFFICE VISIT (OUTPATIENT)
Dept: AUDIOLOGY | Facility: CLINIC | Age: 69
End: 2019-04-19
Payer: COMMERCIAL

## 2019-04-19 DIAGNOSIS — H90.3 SENSORINEURAL HEARING LOSS, BILATERAL: Primary | ICD-10-CM

## 2019-04-19 NOTE — PROGRESS NOTES
AUDIOLOGY REPORT    SUBJECTIVE:   Marilu Rondon is a 68 year old female was seen in Audiology at the [facility] on 4/19/19 to discuss concerns with hearing and functional communication difficulties. Marilu has been seen previously on 4/18/19, and results revealed a bilateral normal to moderate rising to mild sensorineural hearing loss. Marilu notes difficulty with communication in a variety of listening situations.    OBJECTIVE:  Patient is a hearing aid candidate. Patient would like to move forward with a hearing aid evaluation today. Therefore, the patient was presented with different options for amplification to help aid in communication. Discussed styles, levels of technology and monaural vs. binaural fitting.     The hearing aid(s) mutually chosen were:  Binaural: Resound Quattro 7 rechargable  COLOR: Beige  BATTERY SIZE: Rechargable  EARMOLD/TIPS: Closed medium  CANAL/ LENGTH: 2      ASSESSMENT:     Reviewed purchase information and warranty information with patient. The 45 day trial period was explained to patient. The patient was given a copy of the [place] Department of Health consumer brochure on purchasing hearing instruments. Patient risk factors have been provided to the patient in writing prior to the sale of the hearing aid per FDA regulation. The risk factors are also available in the User Instructional Booklet to be presented on the day of the hearing aid fitting. Hearing aid(s) ordered. Hearing aid evaluation completed.    PLAN:   Marilu is scheduled to return in 2-3 weeks for a hearing aid fitting and programming. Purchase agreement will be completed on that date. Please contact this clinic with any questions or concerns.      Arabella Eduardo, [facility]  Licensed Audiologist  [place] License #[id number]

## 2019-05-06 ENCOUNTER — OFFICE VISIT (OUTPATIENT)
Dept: DERMATOLOGY | Facility: CLINIC | Age: 69
End: 2019-05-06
Payer: COMMERCIAL

## 2019-05-06 DIAGNOSIS — L57.0 ACTINIC KERATOSIS: Primary | ICD-10-CM

## 2019-05-06 ASSESSMENT — PAIN SCALES - GENERAL
PAINLEVEL: MODERATE PAIN (4)
PAINLEVEL: NO PAIN (0)

## 2019-05-06 NOTE — PATIENT INSTRUCTIONS
Cryotherapy    What is it?    Use of a very cold liquid, such as liquid nitrogen, to freeze and destroy abnormal skin cells that need to be removed    What should I expect?    Tenderness and redness    A small blister that might grow and fill with dark purple blood. There may be crusting.    More than one treatment may be needed if the lesions do not go away.    How do I care for the treated area?    Gently wash the area with your hands when bathing.    Use a thin layer of Vaseline to help with healing. You may use a Band-Aid.     The area should heal within 7-10 days and may leave behind a pink or lighter color.     Do not use an antibiotic or Neosporin ointment.     You may take acetaminophen (Tylenol) for pain.     Call your Doctor if you have:    Severe pain    Signs of infection (warmth, redness, cloudy yellow drainage, and or a bad smell)    Questions or concerns    Who should I call with questions?       [facility]: [phone]       [facility]: [phone]       For urgent needs outside of business hours call the [facility] at [phone]        and ask for the dermatology resident on call

## 2019-05-06 NOTE — PROGRESS NOTES
[facility] Dermatology Note    Dermatology Clinic  [facility]  [address] [place] [id number]    Dermatology Problem List:  1.Recalls actinic keratosis years ago treated with cryotherapy    Encounter Date: May 6, 2019    CC:  Skin lesion    History of Present Illness:  Ms. Marilu Rondon is a 68 year old female who presents in self referral for a concerning skin lesion. This is her first visit with us at our dermatology clinic.     Noticed a lesion about a year ago on left upper side of lip. 3 months ago became red and within the last 6 weeks it has became elevated and crusty. Does not itch or cause pain or bleed. Has remained dry. Has not been picking at it or scratching. No other lesions similar to it present. Has not had any new contacts to the upper lip or tried anything to treat the lesion. Has had actinic keratoses frozen off years ago she states.    Past Medical History:   Patient Active Problem List   Diagnosis     Myopic degeneration     Senile nuclear sclerosis     Chest pain     Left foot pain     Closed displaced fracture of fifth metatarsal bone of left foot     Past Medical History:   Diagnosis Date     Chest pain 8/31/2015     Nonsenile cataract      Past Surgical History:   Procedure Laterality Date     C RAD RESEC TONSIL/PILLARS  1975     CATARACT IOL, RT/LT  4/8/14    RE     CATARACT IOL, RT/LT Left 02/15/2017     ENHANCE LASER REFRACTIVE BILATERAL EXISTING PT IN PARAMETERS       PHACOEMULSIFICATION WITH STANDARD INTRAOCULAR LENS IMPLANT Left 2/15/2017    Procedure: PHACOEMULSIFICATION WITH STANDARD INTRAOCULAR LENS IMPLANT;  Surgeon: Pramod Britt MD;  Location:  OR       Social History:  Patient reports that she has never smoked. She does not have any smokeless tobacco history on file. She reports that she drinks alcohol, glass of wine at dinner. She reports that she does not use drugs. 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    Family History:  Family History   Problem Relation Age of Onset     Glaucoma Mother      Cancer Mother      Diabetes Brother      Hypertension Brother      Diabetes Father      Hypertension Father      Hyperlipidemia Father      Coronary Artery Disease Father      Diabetes Brother      Hypertension Brother      Hyperlipidemia Brother      Coronary Artery Disease Brother        Medications:  Current Outpatient Medications   Medication Sig Dispense Refill     aspirin 81 MG chewable tablet Take 81 mg by mouth daily       order for DME Walker 1 Units 0     order for DME Roll-A-Bout Walker. Patient can use for up to 2 months 1 Units 0     SIMVASTATIN PO Take 20 mg by mouth every morning          No Known Allergies    Review of Systems:  -As per HPI  -Otherwise nml state of health. No other skin concerns.    Physical exam:  Vitals: There were no vitals taken for this visit.  GEN: This is a well developed, well-nourished female in no acute distress, in a pleasant mood.    SKIN: Focused examination of the upper lip was performed.  -Papule, yellow 4 mm by 2 mm, on the left upper lip on a non erythematous base  -There are erythematous macules with overyling adherent scale on the nose.   -Dorsum of hands with no significant lesions  -No other lesions of concern on areas examined.       Impression/Plan:  1. Actinic keratosis of left upper lip and nose. Left upper lip concerning for cancerous features due to changing features and keratinocytic horn.    Cryotherapy procedure note: After verbal consent and discussion of risks and benefits including but no limited to dyspigmentation/scar, blister, and pain, 3 were treated with 1-2mm freeze border for 2 cycles with liquid nitrogen. Post cryotherapy instructions were provided.    Follow-up in 2-3 months to see if lesion on lip has disappeared. If has grown back, will do a biopsy with pathology.    Follow-up in 3 months, earlier for new or  changing lesions.     Staff Involved:  I, Clayton Pope, saw and discussed the patient with Dr. Franco

## 2019-05-06 NOTE — LETTER
5/6/2019       RE: Marilu Rondon  [address]  [place] [address]     Dear Colleague,    Thank you for referring your patient, Marilu Rondon, to the [facility] DERMATOLOGY at [facility]. Please see a copy of my visit note below.    [facility] Dermatology Note    Dermatology Clinic  [facility]  [address] [place] [id number]    Dermatology Problem List:  1.Recalls actinic keratosis years ago treated with cryotherapy    Encounter Date: May 6, 2019    CC:  Skin lesion    History of Present Illness:  Ms. Marilu Rondon is a 68 year old female who presents in self referral for a concerning skin lesion. This is her first visit with us at our dermatology clinic.     Noticed a lesion about a year ago on left upper side of lip. 3 months ago became red and within the last 6 weeks it has became elevated and crusty. Does not itch or cause pain or bleed. Has remained dry. Has not been picking at it or scratching. No other lesions similar to it present. Has not had any new contacts to the upper lip or tried anything to treat the lesion. Has had actinic keratoses frozen off years ago she states.    Past Medical History:   Patient Active Problem List   Diagnosis     Myopic degeneration     Senile nuclear sclerosis     Chest pain     Left foot pain     Closed displaced fracture of fifth metatarsal bone of left foot     Past Medical History:   Diagnosis Date     Chest pain 8/31/2015     Nonsenile cataract      Past Surgical History:   Procedure Laterality Date     C RAD RESEC TONSIL/PILLARS  1975     CATARACT IOL, RT/LT  4/8/14    RE     CATARACT IOL, RT/LT Left 02/15/2017     ENHANCE LASER REFRACTIVE BILATERAL EXISTING PT IN PARAMETERS       PHACOEMULSIFICATION WITH STANDARD INTRAOCULAR LENS IMPLANT Left 2/15/2017    Procedure: PHACOEMULSIFICATION WITH STANDARD INTRAOCULAR LENS IMPLANT;   Surgeon: Pramod Britt MD;  Location:  OR       Social History:  Patient reports that she has never smoked. She does not have any smokeless tobacco history on file. She reports that she drinks alcohol, glass of wine at dinner. She reports that she does not use drugs. 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    Family History:  Family History   Problem Relation Age of Onset     Glaucoma Mother      Cancer Mother      Diabetes Brother      Hypertension Brother      Diabetes Father      Hypertension Father      Hyperlipidemia Father      Coronary Artery Disease Father      Diabetes Brother      Hypertension Brother      Hyperlipidemia Brother      Coronary Artery Disease Brother        Medications:  Current Outpatient Medications   Medication Sig Dispense Refill     aspirin 81 MG chewable tablet Take 81 mg by mouth daily       order for DME Walker 1 Units 0     order for DME Roll-A-Bout Walker. Patient can use for up to 2 months 1 Units 0     SIMVASTATIN PO Take 20 mg by mouth every morning          No Known Allergies    Review of Systems:  -As per HPI  -Otherwise nml state of health. No other skin concerns.    Physical exam:  Vitals: There were no vitals taken for this visit.  GEN: This is a well developed, well-nourished female in no acute distress, in a pleasant mood.    SKIN: Focused examination of the upper lip was performed.  -Papule, yellow 4 mm by 2 mm, on the left upper lip on a non erythematous base  -There are erythematous macules with overyling adherent scale on the nose.   -Dorsum of hands with no significant lesions  -No other lesions of concern on areas examined.       Impression/Plan:  1. Actinic keratosis of left upper lip and nose. Left upper lip concerning for cancerous features due to changing features and keratinocytic horn.    Cryotherapy procedure note: After verbal consent and discussion of risks and benefits including but no limited to dyspigmentation/scar, blister, and  pain, 3 were treated with 1-2mm freeze border for 2 cycles with liquid nitrogen. Post cryotherapy instructions were provided.    Follow-up in 2-3 months to see if lesion on lip has disappeared. If has grown back, will do a biopsy with pathology.    Follow-up in 3 months, earlier for new or changing lesions.     Staff Involved:  I, Clayton Pope, saw and discussed the patient with Dr. Franco      Again, thank you for allowing me to participate in the care of your patient.      Sincerely,    Robert Franco MD

## 2019-05-06 NOTE — NURSING NOTE
Dermatology Rooming Note    Marilu Rondon's goals for this visit include:   Chief Complaint   Patient presents with     Derm Problem     MARILU IS HERE FOR A SPOT ON HER FACE.      Sylwia Ríos LPN

## 2019-05-16 ENCOUNTER — OFFICE VISIT (OUTPATIENT)
Dept: AUDIOLOGY | Facility: CLINIC | Age: 69
End: 2019-05-16

## 2019-05-16 ENCOUNTER — OFFICE VISIT (OUTPATIENT)
Dept: AUDIOLOGY | Facility: CLINIC | Age: 69
End: 2019-05-16
Payer: COMMERCIAL

## 2019-05-16 DIAGNOSIS — H90.3 SENSORINEURAL HEARING LOSS, BILATERAL: Primary | ICD-10-CM

## 2019-05-16 NOTE — PROGRESS NOTES
AUDIOLOGY REPORT    SUBJECTIVE:   Marilu Rondon is a 68 year old female who was seen in Audiology at the [facility] for a fitting of binaural amplification. Previous results have revealed a bilateral normal to moderate rising to mild sensorineural hearing loss. The patient signed a waiver for medical examination prior to a hearing aid fitting.    OBJECTIVE:   The hearing aid conformity evaluation was completed.The hearing aids were placed and they provided a good fit. Real-ear-probe-microphone measurements were completed on the SkyWire system and were a good match to NAL-NL1 target with soft sounds audible, moderate sounds comfortable, and loud sounds below discomfort. UCLs are verified through maximum power output measures and demonstrate appropriate limiting of loud inputs. Marilu was oriented to proper hearing aid use, care, cleaning (no water, dry brush), batteries (size N/A, insertion/removal, toxicity, low-battery signal), aid insertion/removal, user booklet, warranty information, storage cases, and other hearing aid details. The patient confirmed understanding of hearing aid use and care, and showed proper insertion of hearing aid and batteries while in the office today.Marilu reported good volume and sound quality today.   Hearing aids were programmed as follows:  Program 1:All Around  Program 2:Restaurant    EAR(S) FIT: Bilateral  HEARING AID MODEL NAME: Resound Quattro 7 rechargable  HEARING AID STYLE: -in-the-ear behind-the-ear  EARMOLDS/TIP: 2 LP  with small closed domes  SERIAL NUMBERS: Right: [id number] Left: [id number]  WARRANTY END DATE: 5/22/23    ASSESSMENT:   Binaural Resound Quattro 7  in the canal hearing aid(s) were fit today. Verification measures were performed. Marilu signed the Hearing Aid Purchase Agreement and was given a copy, as well as details on her hearing aids. Patient was counseled that exact out of  pocket amounts cannot be determined for hearing aid claims being sent to insurance. Any insurance coverage information presented to the patient is an estimate only, and is not a guarantee of payment. Patient has been advised to check with their own insurance.    PLAN:  Marilu will return for follow-up in 2-3 weeks for a hearing aid review appointment. Please call this clinic with questions regarding today s appointment.    Arabella Eduardo, [facility]  Licensed Audiologist  [place] License #[id number]

## 2019-06-10 ENCOUNTER — OFFICE VISIT (OUTPATIENT)
Dept: AUDIOLOGY | Facility: CLINIC | Age: 69
End: 2019-06-10
Payer: COMMERCIAL

## 2019-06-10 DIAGNOSIS — H90.3 SENSORINEURAL HEARING LOSS, BILATERAL: Primary | ICD-10-CM

## 2019-06-11 NOTE — PROGRESS NOTES
"AUDIOLOGY REPORT    SUBJECTIVE:  Marilu Rondon is a 68 year old female who was seen in Audiology at the [facility] on 6/10/2019  for a follow-up check regarding the fitting of new hearing aids. Previous results have revealed bilateral normal to moderate rising to mild sensorineural hearing loss.  The patient has been seen previously in this clinic and was fit with Resound Quattro 7 hearing aids on 5/16/19.  Marilu reports she is still deciding about whether or not the hearing aids are worth the cost. She reports she thinks they work better when she activates the noise filter on her phone and is wondering about increasing the amount of noise reduction as a default. Volume has been good per patient    OBJECTIVE:   The International Outcome Inventory-Hearing Aids (IOI-HA) was administered today.The patient s responses to the 7 questions can be compared to normative data relative to how others are performing with their hearing aids, as well as focusing audiologic care and counseling.This patient s Quality of Life score (Question 7) was 2, which is below normative average.     Based on patient report, the following changes were made; noise reduction was increased to \"considerable\" and expansion was turned on.    Reviewed 45 day trial period, care, cleaning (no water, dry brush), batteries (size N/A) insertion/removal, toxicity, low-battery signal), aid insertion/removal, volume adjustment (if applicable), user booklet, warranty information, storage cases, and other hearing aid details.     No charge visit today (in warranty hearing aid check).    ASSESSMENT:   A follow-up appointment for hearing aid fitting was completed today. IOI-HA administered today. Changes to hearing aid was completed as outlined above.     PLAN:  Marilu will return for follow-up as needed, in 2-3 weeks for another appointemnt to check on progress.. Please call this clinic with any questions " regarding today s appointment.    Arabella Eduardo, [facility]  Licensed Audiologist  [place] License #[id number]

## 2019-10-04 ENCOUNTER — HEALTH MAINTENANCE LETTER (OUTPATIENT)
Age: 69
End: 2019-10-04

## 2020-02-08 ENCOUNTER — HEALTH MAINTENANCE LETTER (OUTPATIENT)
Age: 70
End: 2020-02-08

## 2020-11-07 ENCOUNTER — HEALTH MAINTENANCE LETTER (OUTPATIENT)
Age: 70
End: 2020-11-07

## 2021-03-27 ENCOUNTER — HEALTH MAINTENANCE LETTER (OUTPATIENT)
Age: 71
End: 2021-03-27

## 2021-09-08 ENCOUNTER — OFFICE VISIT (OUTPATIENT)
Dept: OPHTHALMOLOGY | Facility: CLINIC | Age: 71
End: 2021-09-08
Attending: OPHTHALMOLOGY
Payer: COMMERCIAL

## 2021-09-08 DIAGNOSIS — H44.23 DEGENERATIVE MYOPIA OF BOTH EYES, UNSPECIFIED WHETHER COMPLICATION PRESENT: Primary | ICD-10-CM

## 2021-09-08 DIAGNOSIS — H44.23 DEGENERATIVE MYOPIA OF BOTH EYES, UNSPECIFIED WHETHER COMPLICATION PRESENT: ICD-10-CM

## 2021-09-08 DIAGNOSIS — H26.492 PCO (POSTERIOR CAPSULAR OPACIFICATION), LEFT: Primary | ICD-10-CM

## 2021-09-08 PROCEDURE — 99213 OFFICE O/P EST LOW 20 MIN: CPT | Mod: 57 | Performed by: OPHTHALMOLOGY

## 2021-09-08 PROCEDURE — G0463 HOSPITAL OUTPT CLINIC VISIT: HCPCS | Mod: 25

## 2021-09-08 PROCEDURE — 66821 AFTER CATARACT LASER SURGERY: CPT | Mod: LT | Performed by: OPHTHALMOLOGY

## 2021-09-08 PROCEDURE — 92134 CPTRZ OPH DX IMG PST SGM RTA: CPT | Performed by: OPHTHALMOLOGY

## 2021-09-08 PROCEDURE — 92015 DETERMINE REFRACTIVE STATE: CPT

## 2021-09-08 ASSESSMENT — REFRACTION_MANIFEST
OS_SPHERE: -4.00
OS_ADD: +2.50
OS_AXIS: 075
OD_ADD: +2.50
OD_CYLINDER: +0.50
OD_SPHERE: -3.50
OD_AXIS: 150
OS_CYLINDER: +2.25

## 2021-09-08 ASSESSMENT — REFRACTION_WEARINGRX
OS_CYLINDER: +1.50
OS_AXIS: 090
SPECS_TYPE: PAL
OD_AXIS: 010
OS_SPHERE: -3.50
OD_ADD: +2.75
OD_SPHERE: -3.50
OD_CYLINDER: +0.50
OS_ADD: +2.75

## 2021-09-08 ASSESSMENT — SLIT LAMP EXAM - LIDS
COMMENTS: NORMAL
COMMENTS: NORMAL

## 2021-09-08 ASSESSMENT — CUP TO DISC RATIO
OS_RATIO: 0.2
OD_RATIO: 0.2

## 2021-09-08 ASSESSMENT — TONOMETRY
OD_IOP_MMHG: 14
IOP_METHOD: TONOPEN
OS_IOP_MMHG: 15

## 2021-09-08 ASSESSMENT — VISUAL ACUITY
OD_CC: 20/40
OD_CC+: +1
METHOD: SNELLEN - LINEAR
OS_CC: 20/50

## 2021-09-08 ASSESSMENT — EXTERNAL EXAM - LEFT EYE: OS_EXAM: NORMAL

## 2021-09-08 ASSESSMENT — CONF VISUAL FIELD
OD_INFERIOR_NASAL_RESTRICTION: 3
OS_INFERIOR_NASAL_RESTRICTION: 3

## 2021-09-08 ASSESSMENT — EXTERNAL EXAM - RIGHT EYE: OD_EXAM: NORMAL

## 2021-09-08 NOTE — PROGRESS NOTES
CC: floaters both eyes   HPI: Marilu Rondon is a  71 year old year-old with history of myopic degeneration evaluation and a lamellar macular hole right eye.  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 Britt  - PCO left eye, pt elects for YAG posterior capsulotomy   -Risks, benefits and alternatives discussed with patient and agreed to proceed yag left eye   - follow up 1 week     # Posterior vitreous detachment (PVD) both eyes   Patient bother by floaters right eye- observe for now and Retinal detachment  precautions    # History of laser in situ keratomileusis (LASIK) surgery both eyes 20 ys ago  Stable     Salo Pichardo MD  Vitreoretinal Surgery Fellow  [facility]       ~~~~~~~~~~~~~~~~~~~~~~~~~~~~~~~~~~   Complete documentation of historical and exam elements from today's encounter can be found in the full encounter summary report (not reduplicated in this progress note).  I personally obtained the chief complaint(s) and history of present illness.  I confirmed and edited as necessary the review of systems, past medical/surgical history, family history, social history, and examination findings as documented by others; and I examined the patient myself.  I  personally reviewed the relevant tests, images, and reports as documented above.  I personally reviewed the ophthalmic test(s) associated with this encounter, agree with the interpretation(s) as documented by the resident/fellow, and have edited the corresponding report(s) as necessary.   I formulated and edited as necessary the assessment and plan and discussed the findings and management plan with the patient and family and No resident or fellow assisted with the procedures performed.  I performed the procedures myself.    Tiffany Gonzalez MD  .  Retina Service   Department of Ophthalmology and Visual Neurosciences   [facility]  Phone: [phone]   Fax: [phone]

## 2021-09-08 NOTE — NURSING NOTE
Chief Complaints and History of Present Illnesses   Patient presents with     Annual Eye Exam     Chief Complaint(s) and History of Present Illness(es)     Annual Eye Exam     Laterality: both eyes    Onset: gradual    Severity: moderate    Frequency: constantly    Timing: throughout the day    Associated symptoms: floaters and dryness.  Negative for flashes, eye pain, itching and discharge    Response to treatment: no improvement    Pain scale: 0/10              Comments     Marilu is here for her annual eye exam. It has bee a couple of years since her last visit. History of Degenerative myopia of both eyes, unspecified whether complication present.  She would like a new glasses RX today as well.     Mark Ewing COT 2:09 PM September 8, 2021

## 2021-09-11 ENCOUNTER — HEALTH MAINTENANCE LETTER (OUTPATIENT)
Age: 71
End: 2021-09-11

## 2021-09-15 ENCOUNTER — OFFICE VISIT (OUTPATIENT)
Dept: OPHTHALMOLOGY | Facility: CLINIC | Age: 71
End: 2021-09-15
Attending: OPHTHALMOLOGY
Payer: COMMERCIAL

## 2021-09-15 DIAGNOSIS — Z96.1 PSEUDOPHAKIA OF BOTH EYES: ICD-10-CM

## 2021-09-15 DIAGNOSIS — H44.23 DEGENERATIVE MYOPIA OF BOTH EYES, UNSPECIFIED WHETHER COMPLICATION PRESENT: Primary | ICD-10-CM

## 2021-09-15 PROCEDURE — G0463 HOSPITAL OUTPT CLINIC VISIT: HCPCS

## 2021-09-15 PROCEDURE — 99207 PR NO BILLABLE SERVICE THIS VISIT: CPT | Mod: GC | Performed by: OPHTHALMOLOGY

## 2021-09-15 ASSESSMENT — EXTERNAL EXAM - LEFT EYE: OS_EXAM: NORMAL

## 2021-09-15 ASSESSMENT — CUP TO DISC RATIO
OD_RATIO: 0.2
OS_RATIO: 0.2

## 2021-09-15 ASSESSMENT — REFRACTION_MANIFEST
OD_SPHERE: -4.00
OD_AXIS: 140
OS_AXIS: 078
OS_SPHERE: -2.25
OS_ADD: +2.50
OS_SPHERE: -4.25
OD_ADD: +2.50
OD_CYLINDER: +0.50
OS_AXIS: 078
OS_CYLINDER: +1.50
OD_CYLINDER: +0.50
OD_SPHERE: -2.00
OS_CYLINDER: +1.50
OD_AXIS: 140

## 2021-09-15 ASSESSMENT — CONF VISUAL FIELD
OD_NORMAL: 1
METHOD: COUNTING FINGERS
OS_NORMAL: 1

## 2021-09-15 ASSESSMENT — REFRACTION_WEARINGRX
OS_ADD: +2.75
OD_SPHERE: -3.50
OS_SPHERE: -3.50
OD_ADD: +2.75
SPECS_TYPE: PAL
OS_AXIS: 090
OD_AXIS: 010
OS_CYLINDER: +1.50
OD_CYLINDER: +0.50

## 2021-09-15 ASSESSMENT — VISUAL ACUITY
CORRECTION_TYPE: GLASSES
OD_CC: 20/30
OS_CC: 20/50
METHOD: SNELLEN - LINEAR

## 2021-09-15 ASSESSMENT — SLIT LAMP EXAM - LIDS
COMMENTS: NORMAL
COMMENTS: NORMAL

## 2021-09-15 ASSESSMENT — EXTERNAL EXAM - RIGHT EYE: OD_EXAM: NORMAL

## 2021-09-15 ASSESSMENT — TONOMETRY
OD_IOP_MMHG: 13
OS_IOP_MMHG: 14
IOP_METHOD: TONOPEN

## 2021-09-15 NOTE — NURSING NOTE
Chief Complaints and History of Present Illnesses   Patient presents with     Follow Up     Chief Complaint(s) and History of Present Illness(es)     Follow Up     Laterality: left eye    Associated symptoms: Negative for floaters and flashes    Treatments tried: no treatments    Pain scale: 0/10              Comments     Follow up post left eye YAG Caps.   Lorraine Ellis, COA, COA 2:02 PM 09/15/2021

## 2021-09-15 NOTE — PROGRESS NOTES
CC: floaters both eyes  Post yag pc left eye 9-8-21:    HPI: Marilu Rondon is a  71 year old year-old with history of myopic degeneration evaluation and a lamellar macular hole right eye.     She is s/p YAG capsulotomy for the left eye for a posterior capsular opacity. Reports no changes in vision (BCVA improved to 20/20 with new MR from 20/30), no new flashes. Has had floaters that are stable. No new floaters.     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 Britt  - Now s/p YAG capsulotomy left eye - BCVA 20/20 improved from 20/30. Happy with visual outcome. DFE unchanged.     # Posterior vitreous detachment (PVD) both eyes   Patient bother by floaters right eye - observe for now and Retinal detachment  precautions    # History of laser in situ keratomileusis (LASIK) surgery both eyes 20 ys ago  Stable     RTC: Return for Follow Up 1 year oct mac, DFE.     Dante Pichardo MD - PGY3  Department of Ophthalmology  Pager: [phone]    ~~~~~~~~~~~~~~~~~~~~~~~~~~~~~~~~~~   Complete documentation of historical and exam elements from today's encounter can be found in the full encounter summary report (not reduplicated in this progress note).  I personally obtained the chief complaint(s) and history of present illness.  I confirmed and edited as  necessary the review of systems, past medical/surgical history, family history, social history, and examination findings as documented by others; and I examined the patient myself.  I personally reviewed the relevant tests, images, and reports as documented above.  I personally reviewed the ophthalmic test(s) associated with this encounter, agree with the interpretation(s) as documented by the resident/fellow, and have edited the corresponding report(s) as necessary.   I formulated and edited as necessary the assessment and plan and discussed the findings and management plan with the patient and family and No resident or fellow assisted with the procedures performed.  I performed the procedures myself.    Tiffany Gonzalez MD  .  Retina Service   Department of Ophthalmology and Visual Neurosciences   [facility]  Phone: [phone]   Fax: [phone]

## 2022-01-05 ENCOUNTER — OFFICE VISIT (OUTPATIENT)
Dept: DERMATOLOGY | Facility: CLINIC | Age: 72
End: 2022-01-05
Payer: COMMERCIAL

## 2022-01-05 DIAGNOSIS — L57.0 ACTINIC KERATOSIS: Primary | ICD-10-CM

## 2022-01-05 PROCEDURE — 17000 DESTRUCT PREMALG LESION: CPT | Performed by: DERMATOLOGY

## 2022-01-05 RX ORDER — FLUOROURACIL 50 MG/G
CREAM TOPICAL
Qty: 40 G | Refills: 0 | Status: SHIPPED | OUTPATIENT
Start: 2022-01-05 | End: 2023-03-07

## 2022-01-05 RX ORDER — CALCIPOTRIENE 50 UG/G
CREAM TOPICAL
Qty: 60 G | Refills: 1 | Status: SHIPPED | OUTPATIENT
Start: 2022-01-05 | End: 2022-04-15

## 2022-01-05 ASSESSMENT — PAIN SCALES - GENERAL: PAINLEVEL: NO PAIN (0)

## 2022-01-05 NOTE — PROGRESS NOTES
[facility] Dermatology Note  Encounter Date: Jan 5, 2022  Office Visit     Dermatology Problem List:  1. AKs.  - Cryo  - Efudex/calcipotriene to cheeks/nose Jan 2022  - Monitor AK on R nasal sidewall  ____________________________________________    Assessment & Plan:     # AKs, one more discrete on R nasal sidewall we will freeze today and more broad/diffuse lesions on cheeks/nose we will plan for field therapy.  - see cryo note  - Start Efudex + calcipotriene BID x 4-10 days to cheeks/nose or until skin gets red. Anticipated reaction discussed.  - Encouraged sunscreen     Procedures Performed:   - Cryotherapy procedure note, location(s): right-sided nasal bridge. After verbal consent and discussion of risks and benefits including, but not limited to, dyspigmentation/scar, blister, and pain, 1 lesion(s) was(were) treated with 1-2 mm freeze border for 1-2 cycles with liquid nitrogen. Post cryotherapy instructions were provided.    Follow-up: 2-3 months, sooner if concerns.    Staff and Scribe:     Sarina Simons DO   [facility] Resident   Pager#[phone]    Precepted with Dr. Davidson     Scribe Disclosure:  I, Jeffrey Beard, am serving as a scribe to document services personally performed by Teagan Obrien MD based on data collection and the provider's statements to me.     Staff Physician Comments:   I saw and evaluated the patient with the resident and I agree with the assessment and plan.  I was present for the entire minor procedure and examination.    Teagan Obrien MD    Department of Dermatology  [facility]: Phone: [phone], Fax: [phone]  1/5/2022     ____________________________________________    CC: Derm Problem (recurrent facial skin lesion, has had for about 1 year)    HPI:  Ms. Marilu Rondon is a(n) 71 year old female who presents today as a return patient for  evaluation of recurrent facial lesions. Patient was last seen by Dr. Franco on 5/6/19 which was notable for an AK of the left upper lip and nose s/p cryotherapy.     Rough spot  1 year   New on her right sided nose over last few months   Develops a scab (white covering)   After a few months, it will rub off   Haven't tried anything   No bleeding, no pain     Wears sunscreen for long walks. Not otherwise.   AK of left upper lip and nose has not recurred.     No personal or family hx of cancer     Patient is otherwise feeling well, without additional skin concerns.    Labs Reviewed:  N/A    Physical Exam:  Vitals: There were no vitals taken for this visit.  SKIN: exam of face.  - non-tender pink scaly papule R nasal sidewall. Other gritty pink macules/broad patches on cheeks and nose.  - No other lesions of concern on areas examined.     Medications:  Current Outpatient Medications   Medication     SIMVASTATIN PO     No current facility-administered medications for this visit.      Past Medical History:   Patient Active Problem List   Diagnosis     Myopic degeneration     Senile nuclear sclerosis     Chest pain     Left foot pain     Closed displaced fracture of fifth metatarsal bone of left foot     Past Medical History:   Diagnosis Date     Chest pain 8/31/2015     Nonsenile cataract         CC No referring provider defined for this encounter. on close of this encounter.

## 2022-01-05 NOTE — PATIENT INSTRUCTIONS
For spots on cheeks and nose, these are low-risk precancers called actinic keratoses. We will treat them with an anti-cancer cream.  Please start Efudex and calcipotriene mixed equally together. Apply twice daily to nose and cheeks for 4-10 days or until skin gets red. Stop applying when skin gets red.  Skin will get red and crusty (like hamburger).  Please keep Efudex away from pets and children. Wash hands thoroughly after application.   See handout below for more information about Efudex.    If calcipotriene is not covered by insurance, can check goodrx.com or use Efudex alone.  To use Efudex alone, apply twice daily for 3-4 weeks as tolerated.    Return in 2-3 months, sooner if concerns.     Efudex Treatment    Today, you are being prescribed Fluorouracil (Efudex) a topical cream used for the treatment of Actinic Keratosis (AKs).  The medication is working to eliminate the unhealthy cells.  This treatment may be unattractive and somewhat uncomfortable.    You will want to stop any other creams such as glycolic acid products, retin A, Tazorac, etc. to the area. You may use bland makeup/cover-up as long as it doesn't sting or cause you discomfort.    Apply the cream at night as your physician recommends. Use a cotton-tipped applicator, or use gloves if applying it with your fingertips. If applied with unprotected fingertips, it is important to wash your hands well after you apply this medicine.     Keep this medication away from pets.    We recommend avoiding excessive sun exposure to the treated area    You may use moisturizing creams over bothersome areas such as Vanicream or Cetaphil cream if the reaction becomes too bothersome. Please, call the clinic if this occurs.   Potential Side Effects    Your treated areas may be unsightly during therapy.  This will improve slowly following the discontinuation of therapy.     During the first week of application, mild inflammation may occur.     During the following  weeks, redness, and swelling may occur with some crusting and burning.     Lesions resolve as the skin exfoliates.     Over 1 to 2 weeks, new skin grows into the treatment area.    Keep this medication away from pets  Specific side effects that usually do not require medical attention (report to your doctor or health care professional if they continue or are bothersome) include:    Red or dark-colored skin     Mild erosion (loss of upper layer of skin)     Mild eye irritation including burning, itching, sensitivity, stinging, or watering     Increased sensitivity of the skin to sun and ultraviolet light     Pain and burning of the affected area     Dryness, scaling or swelling of the affected area     Skin rash, itching of the affected area     Tenderness   If you have severe pain, please, call the clinic immediately and indicate that you have pain.  Ask for a call from the RN.     Who should I call with questions?    [facility]: [phone]    [facility]: [phone]    For urgent needs outside of business hours call the [facility] at [phone] and ask for the dermatology resident on call    Cryotherapy    What is it?    Use of a very cold liquid, such as liquid nitrogen, to freeze and destroy abnormal skin cells that need to be removed    What should I expect?    Tenderness and redness    A small blister that might grow and fill with dark purple blood. There may be crusting.    More than one treatment may be needed if the lesions do not go away.    How do I care for the treated area?    Gently wash the area with your hands when bathing.    Use a thin layer of Vaseline to help with healing. You may use a Band-Aid.     The area should heal within 7-10 days and may leave behind a pink or lighter color.     Do not use an antibiotic or Neosporin ointment.     You may take acetaminophen (Tylenol) for pain.     Call your doctor if you  have:    Severe pain    Signs of infection (warmth, redness, cloudy yellow drainage, and or a bad smell)    Questions or concerns    Who should I call with questions?       [facility]: [phone]       [facility]: [phone]       For urgent needs outside of business hours call the [facility] at [phone] and ask for the dermatology resident on call

## 2022-01-05 NOTE — LETTER
1/5/2022       RE: Marilu Rondon  [address]  [place] [address]     Dear Colleague,    Thank you for referring your patient, Marilu Rondon, to the [facility] DERMATOLOGY CLINIC [place] at [facility]. Please see a copy of my visit note below.    [facility] Dermatology Note  Encounter Date: Jan 5, 2022  Office Visit     Dermatology Problem List:  1. AKs.  - Cryo  - Efudex/calcipotriene to cheeks/nose Jan 2022  - Monitor AK on R nasal sidewall  ____________________________________________    Assessment & Plan:     # AKs, one more discrete on R nasal sidewall we will freeze today and more broad/diffuse lesions on cheeks/nose we will plan for field therapy.  - see cryo note  - Start Efudex + calcipotriene BID x 4-10 days to cheeks/nose or until skin gets red. Anticipated reaction discussed.  - Encouraged sunscreen     Procedures Performed:   - Cryotherapy procedure note, location(s): right-sided nasal bridge. After verbal consent and discussion of risks and benefits including, but not limited to, dyspigmentation/scar, blister, and pain, 1 lesion(s) was(were) treated with 1-2 mm freeze border for 1-2 cycles with liquid nitrogen. Post cryotherapy instructions were provided.    Follow-up: 2-3 months, sooner if concerns.    Staff and Scribe:     Sarina Simons DO   [facility] Medicine Resident   Pager#[phone]    Precepted with Dr. Davidson     Scribe Disclosure:  I, Jeffrey Beard, am serving as a scribe to document services personally performed by Teagan Obrien MD based on data collection and the provider's statements to me.     Staff Physician Comments:   I saw and evaluated the patient with the resident and I agree with the assessment and plan.  I was present for the entire minor procedure and examination.    Teagan Obrien MD    Department of Dermatology  [facility]  [facility]: Phone: [phone], Fax: [phone]  1/5/2022     ____________________________________________    CC: Derm Problem (recurrent facial skin lesion, has had for about 1 year)    HPI:  Ms. Marilu Rondon is a(n) 71 year old female who presents today as a return patient for evaluation of recurrent facial lesions. Patient was last seen by Dr. Franco on 5/6/19 which was notable for an AK of the left upper lip and nose s/p cryotherapy.     Rough spot  1 year   New on her right sided nose over last few months   Develops a scab (white covering)   After a few months, it will rub off   Haven't tried anything   No bleeding, no pain     Wears sunscreen for long walks. Not otherwise.   AK of left upper lip and nose has not recurred.     No personal or family hx of cancer     Patient is otherwise feeling well, without additional skin concerns.    Labs Reviewed:  N/A    Physical Exam:  Vitals: There were no vitals taken for this visit.  SKIN: exam of face.  - non-tender pink scaly papule R nasal sidewall. Other gritty pink macules/broad patches on cheeks and nose.  - No other lesions of concern on areas examined.     Medications:  Current Outpatient Medications   Medication     SIMVASTATIN PO     No current facility-administered medications for this visit.      Past Medical History:   Patient Active Problem List   Diagnosis     Myopic degeneration     Senile nuclear sclerosis     Chest pain     Left foot pain     Closed displaced fracture of fifth metatarsal bone of left foot     Past Medical History:   Diagnosis Date     Chest pain 8/31/2015     Nonsenile cataract         CC No referring provider defined for this encounter. on close of this encounter.

## 2022-01-05 NOTE — NURSING NOTE
Dermatology Rooming Note    Marilu Rondon's goals for this visit include:   Chief Complaint   Patient presents with     Derm Problem     recurrent facial skin lesion, has had for about 1 year     Riana Hernandez CMA on 1/5/2022 at 11:12 AM

## 2022-02-18 ENCOUNTER — OFFICE VISIT (OUTPATIENT)
Dept: DERMATOLOGY | Facility: CLINIC | Age: 72
End: 2022-02-18
Payer: COMMERCIAL

## 2022-02-18 DIAGNOSIS — L57.0 ACTINIC KERATOSIS: Primary | ICD-10-CM

## 2022-02-18 DIAGNOSIS — D48.9 NEOPLASM OF UNCERTAIN BEHAVIOR: ICD-10-CM

## 2022-02-18 PROCEDURE — 11102 TANGNTL BX SKIN SINGLE LES: CPT | Performed by: DERMATOLOGY

## 2022-02-18 PROCEDURE — 88312 SPECIAL STAINS GROUP 1: CPT | Mod: 26 | Performed by: PATHOLOGY

## 2022-02-18 PROCEDURE — 17000 DESTRUCT PREMALG LESION: CPT | Mod: XS | Performed by: DERMATOLOGY

## 2022-02-18 PROCEDURE — 88312 SPECIAL STAINS GROUP 1: CPT | Mod: TC | Performed by: DERMATOLOGY

## 2022-02-18 PROCEDURE — 88342 IMHCHEM/IMCYTCHM 1ST ANTB: CPT | Mod: 26 | Performed by: PATHOLOGY

## 2022-02-18 PROCEDURE — 17003 DESTRUCT PREMALG LES 2-14: CPT | Mod: XS | Performed by: DERMATOLOGY

## 2022-02-18 PROCEDURE — 88305 TISSUE EXAM BY PATHOLOGIST: CPT | Mod: 26 | Performed by: PATHOLOGY

## 2022-02-18 ASSESSMENT — PAIN SCALES - GENERAL: PAINLEVEL: NO PAIN (0)

## 2022-02-18 NOTE — NURSING NOTE
Dermatology Rooming Note    Marilu Rondon's goals for this visit include:   Chief Complaint   Patient presents with     Derm Problem     Marilu is here today for a follow up on a lesion on the face, reports improvement with medication     Erica Waddell, EMT

## 2022-02-18 NOTE — NURSING NOTE
Lidocaine-epinephrine 1-1:086095 % injection   1mL once for one use, starting 2/18/2022 ending 2/18/2022,  2mL disp, R-0, injection  Injected by Riana Brunner

## 2022-02-18 NOTE — LETTER
2/18/2022       RE: Marilu Rondon  [address]  [place] [address]     Dear Colleague,    Thank you for referring your patient, Marilu Rondon, to the [facility] DERMATOLOGY CLINIC [place] at [facility]. Please see a copy of my visit note below.    [facility] Dermatology Note  Encounter Date: Feb 18, 2022  Office Visit     Dermatology Problem List:  1. AKs  - cryo  - Efudex/calcipotriene to right cheek Jan 2022  - Monitor AK on R nasal sidewall    2. NUB, right central cheek s/p biopsy 2/18/2022   ____________________________________________    Assessment & Plan:    # Neoplasm of uncertain behavior, right central cheek. Ddx SCC vs AK vs ISK.  Improved but still present despite field therapy.  - Shave biopsy performed today, see note below.    # Actinic keratoses, x2 - right nasal sidewall & right nasal tip  - Cryotherapy performed today, see note below.    Procedures Performed:   - Shave biopsy procedure note, location(s): see above. After discussion of benefits and risks including but not limited to bleeding, infection, scar, incomplete removal, recurrence, and non-diagnostic biopsy, written consent and photographs were obtained. The area was cleaned with isopropyl alcohol. 0.5mL of 1% lidocaine with epinephrine was injected to obtain adequate anesthesia of lesion(s). Shave biopsy at site(s) performed. Hemostasis was achieved with aluminium chloride. Petrolatum ointment and a sterile dressing were applied. The patient tolerated the procedure and no complications were noted. The patient was provided with verbal and written post care instructions.     - Cryotherapy procedure note, location(s): see above. After verbal consent and discussion of risks and benefits including, but not limited to, dyspigmentation/scar, blister, and pain, 2 AKs were treated with 1-2 mm freeze border for 1-2 cycles with liquid nitrogen. Post  cryotherapy instructions were provided.      Follow-up: 2-3 month(s) in-person, or earlier for new or changing lesions    Staff and Scribe:     Provider Disclosure:   The documentation recorded by the scribe accurately reflects the services I personally performed and the decisions made by me.    Teagan Obrien MD    Department of Dermatology  [facility]: Phone: [phone], Fax: [phone]  2/20/2022       Scribe Disclosure:  I, Asia Xiaodemarcus, am serving as a scribe to document services personally performed by Teagan Obrien MD based on data collection and the provider's statements to me.   ____________________________________________    CC: Derm Problem (Marilu is here today for a follow up on a lesion on the face, reports improvement with medication)    HPI:  Ms. Marilu Rondon is a(n) 71 year old female who presents today as a return patient for AK follow up. The patient was last seen in dermatology on 1/5/2022 by myself at which time 1 AK on the right nasal sidewall was treated with Ln2 and the patient was advised to start Efudex/calcipotriene BID x4-10 days to the right cheek/nose for diffuse actinic damage.    Today, the patient states that she used the Efudex/calcipotriene for about 7 days prior to developing a reaction, at which time she reports some sloughing of the roughness from her face. She notes that there is still some scaly skin present, though it has improved from prior.    Patient is otherwise feeling well, without additional skin concerns.    Labs Reviewed:  N/A    Physical Exam:  Vitals: There were no vitals taken for this visit.  SKIN: Focused examination of the face was performed.  - Two pink gritty macules, one on the right nasal sidewall and one on the right nasal tip.  - Right central cheek faint pink scaly patch.  - No other lesions of concern on areas examined.      Medications:  Current Outpatient Medications   Medication     calcipotriene (DOVONOX) 0.005 % external cream     SIMVASTATIN PO     fluorouracil (EFUDEX) 5 % external cream     No current facility-administered medications for this visit.      Past Medical History:   Patient Active Problem List   Diagnosis     Myopic degeneration     Senile nuclear sclerosis     Chest pain     Left foot pain     Closed displaced fracture of fifth metatarsal bone of left foot     Past Medical History:   Diagnosis Date     Chest pain 8/31/2015     Nonsenile cataract

## 2022-02-18 NOTE — PROGRESS NOTES
[facility] Dermatology Note  Encounter Date: Feb 18, 2022  Office Visit     Dermatology Problem List:  1. AKs  - cryo  - Efudex/calcipotriene to right cheek Jan 2022  - Monitor AK on R nasal sidewall    2. NUB, right central cheek s/p biopsy 2/18/2022   ____________________________________________    Assessment & Plan:    # Neoplasm of uncertain behavior, right central cheek. Ddx SCC vs AK vs ISK.  Improved but still present despite field therapy.  - Shave biopsy performed today, see note below.    # Actinic keratoses, x2 - right nasal sidewall & right nasal tip  - Cryotherapy performed today, see note below.    Procedures Performed:   - Shave biopsy procedure note, location(s): see above. After discussion of benefits and risks including but not limited to bleeding, infection, scar, incomplete removal, recurrence, and non-diagnostic biopsy, written consent and photographs were obtained. The area was cleaned with isopropyl alcohol. 0.5mL of 1% lidocaine with epinephrine was injected to obtain adequate anesthesia of lesion(s). Shave biopsy at site(s) performed. Hemostasis was achieved with aluminium chloride. Petrolatum ointment and a sterile dressing were applied. The patient tolerated the procedure and no complications were noted. The patient was provided with verbal and written post care instructions.     - Cryotherapy procedure note, location(s): see above. After verbal consent and discussion of risks and benefits including, but not limited to, dyspigmentation/scar, blister, and pain, 2 AKs were treated with 1-2 mm freeze border for 1-2 cycles with liquid nitrogen. Post cryotherapy instructions were provided.      Follow-up: 2-3 month(s) in-person, or earlier for new or changing lesions    Staff and Scribe:     Provider Disclosure:   The documentation recorded by the scribe accurately reflects the services I personally performed and the decisions made by me.    Teagan Obrien MD  Assistant  Professor  Department of Dermatology  [facility]: Phone: [phone], Fax: [phone]  2/20/2022       Scribe Disclosure:  I, Asia Dale, am serving as a scribe to document services personally performed by Teagan Obrien MD based on data collection and the provider's statements to me.   ____________________________________________    CC: Derm Problem (Marilu is here today for a follow up on a lesion on the face, reports improvement with medication)    HPI:  Ms. Marilu Rondon is a(n) 71 year old female who presents today as a return patient for AK follow up. The patient was last seen in dermatology on 1/5/2022 by myself at which time 1 AK on the right nasal sidewall was treated with Ln2 and the patient was advised to start Efudex/calcipotriene BID x4-10 days to the right cheek/nose for diffuse actinic damage.    Today, the patient states that she used the Efudex/calcipotriene for about 7 days prior to developing a reaction, at which time she reports some sloughing of the roughness from her face. She notes that there is still some scaly skin present, though it has improved from prior.    Patient is otherwise feeling well, without additional skin concerns.    Labs Reviewed:  N/A    Physical Exam:  Vitals: There were no vitals taken for this visit.  SKIN: Focused examination of the face was performed.  - Two pink gritty macules, one on the right nasal sidewall and one on the right nasal tip.  - Right central cheek faint pink scaly patch.  - No other lesions of concern on areas examined.     Medications:  Current Outpatient Medications   Medication     calcipotriene (DOVONOX) 0.005 % external cream     SIMVASTATIN PO     fluorouracil (EFUDEX) 5 % external cream     No current facility-administered medications for this visit.      Past Medical History:   Patient Active Problem List   Diagnosis     Myopic degeneration     Senile  nuclear sclerosis     Chest pain     Left foot pain     Closed displaced fracture of fifth metatarsal bone of left foot     Past Medical History:   Diagnosis Date     Chest pain 8/31/2015     Nonsenile cataract

## 2022-02-18 NOTE — PATIENT INSTRUCTIONS
Wound Care After a Biopsy    What is a skin biopsy?  A skin biopsy allows the doctor to examine a very small piece of tissue under the microscope to determine the diagnosis and the best treatment for the skin condition. A local anesthetic (numbing medicine)  is injected with a very small needle into the skin area to be tested. A small piece of skin is taken from the area. Sometimes a suture (stitch) is used.     What are the risks of a skin biopsy?  I will experience scar, bleeding, swelling, pain, crusting and redness. I may experience incomplete removal or recurrence. Risks of this procedure are excessive bleeding, bruising, infection, nerve damage, numbness, thick (hypertrophic or keloidal) scar and non-diagnostic biopsy.    How should I care for my wound for the first 24 hours?    Keep the wound dry and covered for 24 hours    If it bleeds, hold direct pressure on the area for 15 minutes. If bleeding does not stop then go to the emergency room    Avoid strenuous exercise the first 1-2 days or as your doctor instructs you    How should I care for the wound after 24 hours?    After 24 hours, remove the bandage    You may bathe or shower as normal    If you had a scalp biopsy, you can shampoo as usual and can use shower water to clean the biopsy site daily    Clean the wound twice a day with gentle soap and water    Do not scrub, be gentle    Apply white petroleum/Vaseline after cleaning the wound with a cotton swab or a clean finger, and keep the site covered with a Bandaid /bandage. Bandages are not necessary with a scalp biopsy    If you are unable to cover the site with a Bandaid /bandage, re-apply ointment 2-3 times a day to keep the site moist. Moisture will help with healing    Avoid strenuous activity for first 1-2 days    Avoid lakes, rivers, pools, and oceans until the stitches are removed or the site is healed    How do I clean my wound?    Wash hands thoroughly with soap or use hand  before all  wound care    Clean the wound with gentle soap and water    Apply white petroleum/Vaseline  to wound after it is clean    Replace the Bandaid /bandage to keep the wound covered for the first few days or as instructed by your doctor    If you had a scalp biopsy, warm shower water to the area on a daily basis should suffice    What should I use to clean my wound?     Cotton-tipped applicators (Qtips )    White petroleum jelly (Vaseline ). Use a clean new container and use Q-tips to apply.    Bandaids   as needed    Gentle soap     How should I care for my wound long term?    Do not get your wound dirty    Keep up with wound care for one week or until the area is healed.    A small scab will form and fall off by itself when the area is completely healed. The area will be red and will become pink in color as it heals. Sun protection is very important for how your scar will turn out. Sunscreen with an SPF 30 or greater is recommended once the area is healed.    If you have stitches, stitches need to be removed in days. You may return to our clinic for this or you may have it done locally at your doctor s office.    You should have some soreness but it should be mild and slowly go away over several days. Talk to your doctor about using tylenol for pain,    When should I call my doctor?  If you have increased:     Pain or swelling    Pus or drainage (clear or slightly yellow drainage is ok)    Temperature over 100F    Spreading redness or warmth around wound    When will I hear about my results?  The biopsy results can take 2 weeks to come back.  Your results will automatically release to [facility] before your provider has even reviewed them.  The clinic will call you with the results, send you a [facility] message, or have you schedule a follow-up clinic or phone time to discuss the results.  Contact our clinics if you do not hear from us in 2 weeks.    Who should I call with questions?    [facility]  [place]: [phone]    [facility]: [phone]    For urgent needs outside of business hours call the [facility] at [phone] and ask for the dermatology resident on call

## 2022-02-23 DIAGNOSIS — L30.9 ECZEMA, UNSPECIFIED TYPE: Primary | ICD-10-CM

## 2022-02-23 LAB
PATH REPORT.COMMENTS IMP SPEC: NORMAL
PATH REPORT.FINAL DX SPEC: NORMAL
PATH REPORT.GROSS SPEC: NORMAL
PATH REPORT.MICROSCOPIC SPEC OTHER STN: NORMAL
PATH REPORT.RELEVANT HX SPEC: NORMAL

## 2022-02-23 RX ORDER — TRIAMCINOLONE ACETONIDE 0.25 MG/G
OINTMENT TOPICAL 2 TIMES DAILY
Qty: 30 G | Refills: 1 | Status: SHIPPED | OUTPATIENT
Start: 2022-02-23 | End: 2023-03-07

## 2022-04-14 NOTE — PROGRESS NOTES
[facility] Dermatology Note  Encounter Date: Apr 15, 2022  Office Visit     Dermatology Problem List:  FBSE 4/15/2022   1. AKs, s/p cryo  - Efudex/calcipotriene to right cheek Jan 2022  - Monitor AK on R nasal sidewall  2. Focal spongiosis and mild perivascular inflammation , right central cheek s/p biopsy 2/18/2022     ____________________________________________    Assessment & Plan:    # Actinic keratosis, right central cheek  Obtained biopsy last visit which was nonspecific but reassuringly no NMSC. There was a suggestion of inflammation and we provided TMC but seems to have improved even without using this. Will treat residual AK and continue monitoring.  - Cryo today, see procedure below  - Notify me for recurrence    # Seborrheic keratoses  - Discussed the natural history and benign nature of this lesion. Reassurance provided that no additional treatment is necessary.      # Multiple benign nevi  # Solar lentigines  - No concerning lesions today  - Monitor for ABCDEs of melanoma   - Continue sun protection - recommend SPF 30 or higher with frequent application   - Return sooner if noticing changing or symptomatic lesions     Procedures Performed:   - Cryotherapy procedure note, location(s): see above. After verbal consent and discussion of risks and benefits including, but not limited to, dyspigmentation/scar, blister, and pain, 1 lesion(s) was(were) treated with 1-2 mm freeze border for 1-2 cycles with liquid nitrogen. Post cryotherapy instructions were provided.    Follow-up: prn for new or changing lesions, advised a skin check every few years would be reasonable    Staff and Scribe:     Scribe Disclosure:  I, Reji Mai, am serving as a scribe to document services personally performed by Teagan Obrien MD based on data collection and the provider's statements to me.     Provider Disclosure:   The documentation recorded by the scribe accurately reflects the services I  personally performed and the decisions made by me.    Teagan Obrien MD    Department of Dermatology  [facility]: Phone: [phone], Fax: [phone]  4/15/2022     ____________________________________________    CC: Derm Problem (Follow up, states that her skin has been doing good)    HPI:  Ms. Marilu Rondon is a(n) 71 year old female who presents today as a return patient for follow-up and FBSE. Last seen by me on 2/18/2022, at which time patient underwent shave biopsy for NUB on the right central cheek which returned as focal spongiosis and mild perivascular inflammation. Additionally, patient underwent cryo for treatment of actinic keratoses.    Today, patient reports that the biopsied area on right central cheek mostly has cleared up. Used the steroid on it a few times but seems to have improved even without using it. She also reports a raised area on the left side of her nose that she would like examined.    Patient is otherwise feeling well, without additional skin concerns.    Labs Reviewed:  N/A    Physical Exam:  Vitals: There were no vitals taken for this visit.  SKIN: Total skin excluding the undergarment areas was performed. The exam included the head/face, neck, both arms, chest, back, abdomen, both legs, digits and/or nails.    - Well healed scar, right central cheek. One slightly gritty area.  - There is a waxy stuck on tan to brown papule on the left lateral nose.   - Multiple regular brown pigmented macules and papules are identified on the trunk and extremities.   - Scattered brown macules on sun exposed areas.  - There are waxy stuck on tan to brown papules on the trunk and extremities.   - No other lesions of concern on areas examined.     Medications:  Current Outpatient Medications   Medication     fluorouracil (EFUDEX) 5 % external cream     SIMVASTATIN PO     triamcinolone (KENALOG) 0.025  % external ointment     calcipotriene (DOVONOX) 0.005 % external cream     No current facility-administered medications for this visit.      Past Medical History:   Patient Active Problem List   Diagnosis     Myopic degeneration     Senile nuclear sclerosis     Chest pain     Left foot pain     Closed displaced fracture of fifth metatarsal bone of left foot     Past Medical History:   Diagnosis Date     Chest pain 8/31/2015     Nonsenile cataract         CC Estefanía Ernandez MD  [address] [address] on close of this encounter.

## 2022-04-15 ENCOUNTER — OFFICE VISIT (OUTPATIENT)
Dept: DERMATOLOGY | Facility: CLINIC | Age: 72
End: 2022-04-15
Payer: COMMERCIAL

## 2022-04-15 DIAGNOSIS — L57.0 ACTINIC KERATOSIS: ICD-10-CM

## 2022-04-15 DIAGNOSIS — D22.9 MULTIPLE BENIGN NEVI: Primary | ICD-10-CM

## 2022-04-15 DIAGNOSIS — L82.1 SEBORRHEIC KERATOSIS: ICD-10-CM

## 2022-04-15 DIAGNOSIS — L81.4 SOLAR LENTIGO: ICD-10-CM

## 2022-04-15 PROCEDURE — 17000 DESTRUCT PREMALG LESION: CPT | Performed by: DERMATOLOGY

## 2022-04-15 PROCEDURE — 99213 OFFICE O/P EST LOW 20 MIN: CPT | Mod: 25 | Performed by: DERMATOLOGY

## 2022-04-15 ASSESSMENT — PAIN SCALES - GENERAL: PAINLEVEL: NO PAIN (0)

## 2022-04-15 NOTE — NURSING NOTE
Dermatology Rooming Note    Marilu Rondon's goals for this visit include:   Chief Complaint   Patient presents with     Derm Problem     Follow up, states that her skin has been doing good     Riana Hernandez CMA on 4/15/2022 at 9:52 AM

## 2022-04-15 NOTE — PATIENT INSTRUCTIONS
Cryotherapy    What is it?  Use of a very cold liquid, such as liquid nitrogen, to freeze and destroy abnormal skin cells that need to be removed    What should I expect?  Tenderness and redness  A small blister that might grow and fill with dark purple blood. There may be crusting.  More than one treatment may be needed if the lesions do not go away.    How do I care for the treated area?  Gently wash the area with your hands when bathing.  Use a thin layer of Vaseline to help with healing. You may use a Band-Aid.   The area should heal within 7-10 days and may leave behind a pink or lighter color.   Do not use an antibiotic or Neosporin ointment.   You may take acetaminophen (Tylenol) for pain.     Call your doctor if you have:  Severe pain  Signs of infection (warmth, redness, cloudy yellow drainage, and or a bad smell)  Questions or concerns    Who should I call with questions?      [facility]: [phone]      [facility]: [phone]      For urgent needs outside of business hours call the [facility] at [phone] and ask for the dermatology resident on call     Patient Education     Checking for Skin Cancer  You can find cancer early by checking your skin each month. There are 3 kinds of skin cancer. They are melanoma, basal cell carcinoma, and squamous cell carcinoma. Doing monthly skin checks is the best way to find new marks or skin changes. Follow the instructions below for checking your skin.   The ABCDEs of checking moles for melanoma   Check your moles or growths for signs of melanoma using ABCDE:   Asymmetry: the sides of the mole or growth don t match  Border: the edges are ragged, notched, or blurred  Color: the color within the mole or growth varies  Diameter: the mole or growth is larger than 6 mm (size of a pencil eraser)  Evolving: the size, shape, or color of the mole or growth is changing (evolving is not shown in the  images below)    Checking for other types of skin cancer  Basal cell carcinoma or squamous cell carcinoma have symptoms such as:     A spot or mole that looks different from all other marks on your skin  Changes in how an area feels, such as itching, tenderness, or pain  Changes in the skin's surface, such as oozing, bleeding, or scaliness  A sore that does not heal  New swelling or redness beyond the border of a mole    Who s at risk?  Anyone can get skin cancer. But you are at greater risk if you have:   Fair skin, light-colored hair, or light-colored eyes  Many moles or abnormal moles on your skin  A history of sunburns from sunlight or tanning beds  A family history of skin cancer  A history of exposure to radiation or chemicals  A weakened immune system  If you have had skin cancer in the past, you are at risk for recurring skin cancer.   How to check your skin  Do your monthly skin checkups in front of a full-length mirror. Check all parts of your body, including your:   Head (ears, face, neck, and scalp)  Torso (front, back, and sides)  Arms (tops, undersides, upper, and lower armpits)  Hands (palms, backs, and fingers, including under the nails)  Buttocks and genitals  Legs (front, back, and sides)  Feet (tops, soles, toes, including under the nails, and between toes)  If you have a lot of moles, take digital photos of them each month. Make sure to take photos both up close and from a distance. These can help you see if any moles change over time.   Most skin changes are not cancer. But if you see any changes in your skin, call your doctor right away. Only he or she can diagnose a problem. If you have skin cancer, seeing your doctor can be the first step toward getting the treatment that could save your life.   [employer] last reviewed this educational content on 4/1/2019 2000-2020 [employer]. [address] [place] [address], [place] [address]. All rights reserved. This information is not intended as  a substitute for professional medical care. Always follow your healthcare professional's instructions.       When should I call my doctor?  If you are worsening or not improving, please, contact us or seek urgent care as noted below.     Who should I call with questions (adults)?  [facility] (adult and pediatric): [phone]  [facility] (adult): [phone]  For urgent needs outside of business hours call the [facility] at [phone] and ask for the dermatology resident on call to be paged  If this is a medical emergency and you are unable to reach an ER, Call [phone]    Who should I call with questions (pediatric)?  [facility]- Pediatric Dermatology  Dr. India Cevallos, Dr. Sharda Woody, Dr. Georgiana Shah, IRMA Draper, Dr. Emperatriz Zaldivar, Dr. Mimi Gongora & Dr. Jeffrey Colvin  Non-urgent nurse triage line; [phone]- Mary Jo and Bri PARSON Care Coordinators   [place] (/Complex ) [phone]    If you need a prescription refill, please contact your pharmacy. Refills are approved or denied by our Physicians during normal business hours, Monday through Fridays  Per office policy, refills will not be granted if you have not been seen within the past year (or sooner depending on your child's condition)    Scheduling Information:  Pediatric Appointment Scheduling and Call Center [phone]  Radiology Scheduling- [phone]  Sedation Unit Scheduling- [phone]  [place] Scheduling- General [phone]; Pediatric Dermatology [phone]  Main  Services: [phone]  Syriac: [phone]  Macedonian: [phone]  Hmong/Lebanese/Persian: [phone]  Preadmission Nursing Department Fax Number: [phone] (Fax all pre-operative paperwork to this number)    For urgent matters arising during evenings, weekends, or holidays that cannot wait for normal business hours please  call [phone] and ask for the dermatology resident on call to be paged.

## 2022-04-15 NOTE — LETTER
4/15/2022       RE: Marilu Rondon  [address]  [place] [address]     Dear Colleague,    Thank you for referring your patient, Marilu Rondon, to the [facility] [place] at [facility]. Please see a copy of my visit note below.    [facility] Dermatology Note  Encounter Date: Apr 15, 2022  Office Visit     Dermatology Problem List:  FBSE 4/15/2022   1. AKs, s/p cryo  - Efudex/calcipotriene to right cheek Jan 2022  - Monitor AK on R nasal sidewall  2. Focal spongiosis and mild perivascular inflammation , right central cheek s/p biopsy 2/18/2022     ____________________________________________    Assessment & Plan:    # Actinic keratosis, right central cheek  Obtained biopsy last visit which was nonspecific but reassuringly no NMSC. There was a suggestion of inflammation and we provided TMC but seems to have improved even without using this. Will treat residual AK and continue monitoring.  - Cryo today, see procedure below  - Notify me for recurrence    # Seborrheic keratoses  - Discussed the natural history and benign nature of this lesion. Reassurance provided that no additional treatment is necessary.      # Multiple benign nevi  # Solar lentigines  - No concerning lesions today  - Monitor for ABCDEs of melanoma   - Continue sun protection - recommend SPF 30 or higher with frequent application   - Return sooner if noticing changing or symptomatic lesions     Procedures Performed:   - Cryotherapy procedure note, location(s): see above. After verbal consent and discussion of risks and benefits including, but not limited to, dyspigmentation/scar, blister, and pain, 1 lesion(s) was(were) treated with 1-2 mm freeze border for 1-2 cycles with liquid nitrogen. Post cryotherapy instructions were provided.    Follow-up: prn for new or changing lesions, advised a skin check every few years would be  reasonable    Staff and Scribe:     Scribe Disclosure:  I, Reji Garcias, am serving as a scribe to document services personally performed by Teagan Obrien MD based on data collection and the provider's statements to me.     Provider Disclosure:   The documentation recorded by the scribe accurately reflects the services I personally performed and the decisions made by me.    Teagan Obrien MD    Department of Dermatology  [facility]: Phone: [phone], Fax: [phone]  4/15/2022     ____________________________________________    CC: Derm Problem (Follow up, states that her skin has been doing good)    HPI:  Ms. Marilu Rondon is a(n) 71 year old female who presents today as a return patient for follow-up and FBSE. Last seen by me on 2/18/2022, at which time patient underwent shave biopsy for NUB on the right central cheek which returned as focal spongiosis and mild perivascular inflammation. Additionally, patient underwent cryo for treatment of actinic keratoses.    Today, patient reports that the biopsied area on right central cheek mostly has cleared up. Used the steroid on it a few times but seems to have improved even without using it. She also reports a raised area on the left side of her nose that she would like examined.    Patient is otherwise feeling well, without additional skin concerns.    Labs Reviewed:  N/A    Physical Exam:  Vitals: There were no vitals taken for this visit.  SKIN: Total skin excluding the undergarment areas was performed. The exam included the head/face, neck, both arms, chest, back, abdomen, both legs, digits and/or nails.    - Well healed scar, right central cheek. One slightly gritty area.  - There is a waxy stuck on tan to brown papule on the left lateral nose.   - Multiple regular brown pigmented macules and papules are identified on the trunk and extremities.   -  Scattered brown macules on sun exposed areas.  - There are waxy stuck on tan to brown papules on the trunk and extremities.   - No other lesions of concern on areas examined.     Medications:  Current Outpatient Medications   Medication     fluorouracil (EFUDEX) 5 % external cream     SIMVASTATIN PO     triamcinolone (KENALOG) 0.025 % external ointment     calcipotriene (DOVONOX) 0.005 % external cream     No current facility-administered medications for this visit.      Past Medical History:   Patient Active Problem List   Diagnosis     Myopic degeneration     Senile nuclear sclerosis     Chest pain     Left foot pain     Closed displaced fracture of fifth metatarsal bone of left foot     Past Medical History:   Diagnosis Date     Chest pain 8/31/2015     Nonsenile cataract         CC Estefanía Ernandez MD  [address] [address] on close of this encounter.

## 2022-04-22 ENCOUNTER — ANCILLARY PROCEDURE (OUTPATIENT)
Dept: MAMMOGRAPHY | Facility: CLINIC | Age: 72
End: 2022-04-22
Attending: FAMILY MEDICINE
Payer: COMMERCIAL

## 2022-04-22 DIAGNOSIS — Z12.31 VISIT FOR SCREENING MAMMOGRAM: ICD-10-CM

## 2022-04-22 PROCEDURE — 77067 SCR MAMMO BI INCL CAD: CPT | Mod: GC | Performed by: RADIOLOGY

## 2022-04-22 PROCEDURE — 77063 BREAST TOMOSYNTHESIS BI: CPT | Mod: GC | Performed by: RADIOLOGY

## 2022-04-23 ENCOUNTER — HEALTH MAINTENANCE LETTER (OUTPATIENT)
Age: 72
End: 2022-04-23

## 2022-10-29 ENCOUNTER — HEALTH MAINTENANCE LETTER (OUTPATIENT)
Age: 72
End: 2022-10-29

## 2023-03-02 ENCOUNTER — OFFICE VISIT (OUTPATIENT)
Dept: OPHTHALMOLOGY | Facility: CLINIC | Age: 73
End: 2023-03-02
Attending: OPHTHALMOLOGY
Payer: MEDICARE

## 2023-03-02 DIAGNOSIS — H44.23 DEGENERATIVE MYOPIA OF BOTH EYES, UNSPECIFIED WHETHER COMPLICATION PRESENT: Primary | ICD-10-CM

## 2023-03-02 PROCEDURE — 92014 COMPRE OPH EXAM EST PT 1/>: CPT | Mod: GC | Performed by: OPHTHALMOLOGY

## 2023-03-02 PROCEDURE — 92015 DETERMINE REFRACTIVE STATE: CPT

## 2023-03-02 PROCEDURE — 92134 CPTRZ OPH DX IMG PST SGM RTA: CPT | Performed by: OPHTHALMOLOGY

## 2023-03-02 PROCEDURE — G0463 HOSPITAL OUTPT CLINIC VISIT: HCPCS | Mod: 25

## 2023-03-02 PROCEDURE — 92134 CPTRZ OPH DX IMG PST SGM RTA: CPT | Mod: 26 | Performed by: OPHTHALMOLOGY

## 2023-03-02 ASSESSMENT — CONF VISUAL FIELD
METHOD: COUNTING FINGERS
OD_SUPERIOR_TEMPORAL_RESTRICTION: 0
OD_NORMAL: 1
OS_NORMAL: 1
OD_INFERIOR_NASAL_RESTRICTION: 0
OD_SUPERIOR_NASAL_RESTRICTION: 0
OS_INFERIOR_TEMPORAL_RESTRICTION: 0
OS_SUPERIOR_TEMPORAL_RESTRICTION: 0
OS_SUPERIOR_NASAL_RESTRICTION: 0
OD_INFERIOR_TEMPORAL_RESTRICTION: 0
OS_INFERIOR_NASAL_RESTRICTION: 0

## 2023-03-02 ASSESSMENT — SLIT LAMP EXAM - LIDS
COMMENTS: NORMAL
COMMENTS: NORMAL

## 2023-03-02 ASSESSMENT — TONOMETRY
OD_IOP_MMHG: 15
OS_IOP_MMHG: 16
IOP_METHOD: ICARE

## 2023-03-02 ASSESSMENT — REFRACTION_MANIFEST
OS_SPHERE: -4.50
OD_SPHERE: -3.25
OS_CYLINDER: +1.75
OD_CYLINDER: +0.50
OS_AXIS: 070
OD_ADD: +2.50
OS_ADD: +2.50
OD_AXIS: 140

## 2023-03-02 ASSESSMENT — REFRACTION_WEARINGRX
OS_ADD: +2.50
OD_SPHERE: -4.00
OS_CYLINDER: +1.50
OS_SPHERE: -4.25
OD_AXIS: 140
OD_CYLINDER: +0.50
OD_ADD: +2.50
OS_AXIS: 078

## 2023-03-02 ASSESSMENT — VISUAL ACUITY
OS_CC+: -2
METHOD: SNELLEN - LINEAR
OD_CC+: -2
OD_CC: 20/40
OS_CC: 20/30

## 2023-03-02 ASSESSMENT — CUP TO DISC RATIO
OD_RATIO: 0.2
OS_RATIO: 0.2

## 2023-03-02 ASSESSMENT — EXTERNAL EXAM - LEFT EYE: OS_EXAM: NORMAL

## 2023-03-02 ASSESSMENT — EXTERNAL EXAM - RIGHT EYE: OD_EXAM: NORMAL

## 2023-03-02 NOTE — PROGRESS NOTES
CC: floaters both eyes   HPI: Here for annual exam. Vision is about the same since last visit. Notes occasional diplopia when tired, when she has a glass of wine, or when it is dark out. It disappears when she closes one eye. She is uncertain if it is horizontal or vertical. She has noticed it over the past 6 months very rarely. Stable flashes and floaters. No eye pain. No gtts.     Retinal Imaging:  OCT  03/02/23  RE: schisis and lamellar hole; no subretinal fluid - stable Optical Coherence Tomography   LE: good foveal contour, 1+ ERM with progression of disruption of foveal contour; no disruption of IS/OS    Assessment & Plan:  #ERM, OS  -slight worsening of foveal contour  -VA 20/25 and pt not bothered at this time    # History of high myopia both eyes  With  myopic degeneration  VA stable  Retina detachment precautions were discussed with the patient (presence or increased in flashes, floaters or a curtain in the visual field) and was asked to return if any of the those occur    # 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 Britt  - PCO left eye, pt elects for YAG posterior capsulotomy   -Risks, benefits and alternatives discussed with patient and agreed to proceed yag left eye     # Posterior vitreous detachment (PVD) both eyes   Patient bother by floaters right eye- observe for now and Retinal detachment  Precautions 03/02/23    # History of laser in situ keratomileusis (LASIK) surgery both eyes 20 ys ago  Stable     #Esophoria  No hx of strabismus sx or patching hx; denies eye trauma   occasional diplopia when tired, patient not bothered  consider orthoptist evaluation or Referral Neuro-opth/strab if symptoms worsen    Retina: 1 year, OCT, DFE both eyes  New prescription given     Paulie Tsang MD MPH  Vitreoretinal Fellow  PGY-5  [facility]     ~~~~~~~~~~~~~~~~~~~~~~~~~~~~~~~~~~   Complete documentation of historical and exam elements from today's encounter can be found in the full encounter summary report (not reduplicated in this progress note).  I personally obtained the chief complaint(s) and history of present illness.  I confirmed and edited as necessary the review of systems, past medical/surgical history, family history, social history, and examination findings as documented by others; and I examined the patient myself.  I personally reviewed the relevant tests, images, and reports as documented above.  I personally reviewed the ophthalmic test(s) associated with this encounter, agree with the interpretation(s) as documented by the resident/fellow, and have edited the corresponding report(s) as necessary.   I formulated and edited as necessary the assessment and plan and discussed the findings and management plan with the patient and family    Tiffany Gonzalez MD  .  Retina Service   Department of Ophthalmology and Visual Neurosciences   [facility]  Phone: [phone]   Fax: [phone]

## 2023-03-02 NOTE — NURSING NOTE
Chief Complaints and History of Present Illnesses   Patient presents with     Annual Eye Exam     Chief Complaint(s) and History of Present Illness(es)     Annual Eye Exam            Laterality: both eyes    Associated symptoms: double vision, flashes and floaters.  Negative for eye pain          Comments    Here for annual exam. Vision is about the same since last visit. Notes occasional diplopia. Stable flashes and floaters. No eye pain. No gtts.    Sagar Danni COT 1:23 PM March 2, 2023

## 2023-03-03 ENCOUNTER — OFFICE VISIT (OUTPATIENT)
Dept: DERMATOLOGY | Facility: CLINIC | Age: 73
End: 2023-03-03
Payer: MEDICARE

## 2023-03-03 DIAGNOSIS — D49.2 NEOPLASM OF UNSPECIFIED BEHAVIOR OF BONE, SOFT TISSUE, AND SKIN: ICD-10-CM

## 2023-03-03 DIAGNOSIS — D22.9 MULTIPLE BENIGN NEVI: Primary | ICD-10-CM

## 2023-03-03 DIAGNOSIS — L57.8 ACTINIC SKIN DAMAGE: ICD-10-CM

## 2023-03-03 DIAGNOSIS — K13.0 ANGULAR CHEILITIS: ICD-10-CM

## 2023-03-03 DIAGNOSIS — L81.4 SOLAR LENTIGO: ICD-10-CM

## 2023-03-03 DIAGNOSIS — L82.1 SEBORRHEIC KERATOSIS: ICD-10-CM

## 2023-03-03 PROCEDURE — 88305 TISSUE EXAM BY PATHOLOGIST: CPT | Mod: TC | Performed by: DERMATOLOGY

## 2023-03-03 PROCEDURE — 11102 TANGNTL BX SKIN SINGLE LES: CPT | Performed by: DERMATOLOGY

## 2023-03-03 PROCEDURE — 99213 OFFICE O/P EST LOW 20 MIN: CPT | Mod: 25 | Performed by: DERMATOLOGY

## 2023-03-03 PROCEDURE — 88305 TISSUE EXAM BY PATHOLOGIST: CPT | Mod: 26 | Performed by: PATHOLOGY

## 2023-03-03 RX ORDER — KETOCONAZOLE 20 MG/G
CREAM TOPICAL 2 TIMES DAILY
Qty: 60 G | Refills: 11 | Status: SHIPPED | OUTPATIENT
Start: 2023-03-03

## 2023-03-03 ASSESSMENT — PAIN SCALES - GENERAL: PAINLEVEL: NO PAIN (0)

## 2023-03-03 NOTE — NURSING NOTE
Dermatology Rooming Note    Marilu Rondon's goals for this visit include:   Chief Complaint   Patient presents with     Derm Problem     Marilu is here today for a lesion of concern on chest.      Carina Lombardo RN

## 2023-03-03 NOTE — PATIENT INSTRUCTIONS
Wound Care After a Biopsy    What is a skin biopsy?  A skin biopsy allows the doctor to examine a very small piece of tissue under the microscope to determine the diagnosis and the best treatment for the skin condition. A local anesthetic (numbing medicine)  is injected with a very small needle into the skin area to be tested. A small piece of skin is taken from the area. Sometimes a suture (stitch) is used.     What are the risks of a skin biopsy?  I will experience scar, bleeding, swelling, pain, crusting and redness. I may experience incomplete removal or recurrence. Risks of this procedure are excessive bleeding, bruising, infection, nerve damage, numbness, thick (hypertrophic or keloidal) scar and non-diagnostic biopsy.    How should I care for my wound for the first 24 hours?  Keep the wound dry and covered for 24 hours  If it bleeds, hold direct pressure on the area for 15 minutes. If bleeding does not stop then go to the emergency room  Avoid strenuous exercise the first 1-2 days or as your doctor instructs you    How should I care for the wound after 24 hours?  After 24 hours, remove the bandage  You may bathe or shower as normal  If you had a scalp biopsy, you can shampoo as usual and can use shower water to clean the biopsy site daily  Clean the wound twice a day with gentle soap and water  Do not scrub, be gentle  Apply white petroleum/Vaseline after cleaning the wound with a cotton swab or a clean finger, and keep the site covered with a Bandaid /bandage. Bandages are not necessary with a scalp biopsy  If you are unable to cover the site with a Bandaid /bandage, re-apply ointment 2-3 times a day to keep the site moist. Moisture will help with healing  Avoid strenuous activity for first 1-2 days  Avoid lakes, rivers, pools, and oceans until the stitches are removed or the site is healed    How do I clean my wound?  Wash hands thoroughly with soap or use hand  before all wound care  Clean the  wound with gentle soap and water  Apply white petroleum/Vaseline  to wound after it is clean  Replace the Bandaid /bandage to keep the wound covered for the first few days or as instructed by your doctor  If you had a scalp biopsy, warm shower water to the area on a daily basis should suffice    What should I use to clean my wound?   Cotton-tipped applicators (Qtips )  White petroleum jelly (Vaseline ). Use a clean new container and use Q-tips to apply.  Bandaids   as needed  Gentle soap     How should I care for my wound long term?  Do not get your wound dirty  Keep up with wound care for one week or until the area is healed.  A small scab will form and fall off by itself when the area is completely healed. The area will be red and will become pink in color as it heals. Sun protection is very important for how your scar will turn out. Sunscreen with an SPF 30 or greater is recommended once the area is healed.  You should have some soreness but it should be mild and slowly go away over several days. Talk to your doctor about using tylenol for pain,    When should I call my doctor?  If you have increased:   Pain or swelling  Pus or drainage (clear or slightly yellow drainage is ok)  Temperature over 100F  Spreading redness or warmth around wound    When will I hear about my results?  The biopsy results can take 2 weeks to come back.  Your results will automatically release to [place] before your provider has even reviewed them.  The clinic will call you with the results, send you a [employer] message, or have you schedule a follow-up clinic or phone time to discuss the results.  Contact our clinics if you do not hear from us in 2 weeks.    Who should I call with questions?  [facility]: [phone]  [facility]: [phone]  For urgent needs outside of business hours call the [facility] at [phone] and ask for the dermatology resident on call    Patient Education     Checking for Skin Cancer  You can find cancer early by checking your skin each month. There are 3 kinds of skin cancer. They are melanoma, basal cell carcinoma, and squamous cell carcinoma. Doing monthly skin checks is the best way to find new marks or skin changes. Follow the instructions below for checking your skin.   The ABCDEs of checking moles for melanoma   Check your moles or growths for signs of melanoma using ABCDE:   Asymmetry: the sides of the mole or growth don t match  Border: the edges are ragged, notched, or blurred  Color: the color within the mole or growth varies  Diameter: the mole or growth is larger than 6 mm (size of a pencil eraser)  Evolving: the size, shape, or color of the mole or growth is changing (evolving is not shown in the images below)    Checking for other types of skin cancer  Basal cell carcinoma or squamous cell carcinoma have symptoms such as:     A spot or mole that looks different from all other marks on your skin  Changes in how an area feels, such as itching, tenderness, or pain  Changes in the skin's surface, such as oozing, bleeding, or scaliness  A sore that does not heal  New swelling or redness beyond the border of a mole    Who s at risk?  Anyone can get skin cancer. But you are at greater risk if you have:   Fair skin, light-colored hair, or light-colored eyes  Many moles or abnormal moles on your skin  A history of sunburns from sunlight or tanning beds  A family history of skin cancer  A history of exposure to radiation or chemicals  A weakened immune system  If you have had skin cancer in the past, you are at risk for recurring skin cancer.   How to check your skin  Do your monthly skin checkups in front of a full-length mirror. Check all parts of your body, including your:   Head (ears, face, neck, and scalp)  Torso (front, back, and sides)  Arms (tops, undersides, upper, and lower armpits)  Hands (palms, backs, and fingers, including  under the nails)  Buttocks and genitals  Legs (front, back, and sides)  Feet (tops, soles, toes, including under the nails, and between toes)  If you have a lot of moles, take digital photos of them each month. Make sure to take photos both up close and from a distance. These can help you see if any moles change over time.   Most skin changes are not cancer. But if you see any changes in your skin, call your doctor right away. Only he or she can diagnose a problem. If you have skin cancer, seeing your doctor can be the first step toward getting the treatment that could save your life.   [employer] last reviewed this educational content on 4/1/2019 2000-2020 [employer]. [address] [place] [address], [place] [address]. All rights reserved. This information is not intended as a substitute for professional medical care. Always follow your healthcare professional's instructions.       When should I call my doctor?  If you are worsening or not improving, please, contact us or seek urgent care as noted below.     Who should I call with questions (adults)?  [facility] (adult and pediatric): [phone]  [facility] (adult): [phone]  For urgent needs outside of business hours call the [facility] at [phone] and ask for the dermatology resident on call to be paged  If this is a medical emergency and you are unable to reach an ER, Call [phone]    Who should I call with questions (pediatric)?  [facility]- Pediatric Dermatology  Dr. India Cevallos, Dr. Sharda Woody, Dr. Georgiana Shah, IRMA Draper, Dr. Emperatriz Zaldivar, Dr. Mimi Gongora & Dr. Jeffrey Colvin  Non-urgent nurse triage line; [phone]- Mary Jo and Bri PARSON Care Coordinatorbeatriz Dash (/Complex ) [phone]    If you need a prescription refill, please contact your pharmacy. Refills are approved or denied by our  Physicians during normal business hours, Monday through Fridays  Per office policy, refills will not be granted if you have not been seen within the past year (or sooner depending on your child's condition)    Scheduling Information:  Pediatric Appointment Scheduling and Call Center [phone]  Radiology Scheduling- [phone]  Sedation Unit Scheduling- [phone]  [place] Scheduling- General [phone]; Pediatric Dermatology [phone]  Main  Services: [phone]  Amharic: [phone]  Iranian: [phone]  Hmong/Belizean/Lithuanian: [phone]  Preadmission Nursing Department Fax Number: [phone] (Fax all pre-operative paperwork to this number)    For urgent matters arising during evenings, weekends, or holidays that cannot wait for normal business hours please call [phone] and ask for the dermatology resident on call to be paged.

## 2023-03-03 NOTE — PROGRESS NOTES
[facility] Dermatology Note  Encounter Date: Mar 3, 2023  Office Visit     Dermatology Problem List:  # FBSE 3/3/2023  1. AKs, s/p cryo  - Efudex/calcipotriene to right cheek Jan 2022 (prescribed through [facility])  - Efudex/calcipotriene to nose March 2023 (prescribed through [facility])  - Monitor AK on R nasal sidewall  2. Focal spongiosis and mild perivascular inflammation , right central cheek s/p biopsy 2/18/2022   - Treated briefly with [facility] but had essentially self-improvement   - also treated as AK as above  3. Angular cheilitis,  - ketoconazole 2% cream  # NUB, right medial chest, s/p shave bx 3/3/2023  ____________________________________________    Assessment & Plan:    # NUB, right medial chest, ddx: ISK, rule out SCC  - Shave biopsy today, see procedure note below    # Actinic skin damage, nose, diffuse  - Start Efudex + calcipotriene BID for 4-10 days (prescribed through [facility])    # Angular cheilitis, right lateral commissure  - Start ketoconazole 2% cream     # Multiple benign nevi  # Solar lentigines   - No concerning lesions today  - Monitor for ABCDEs of melanoma   - Continue sun protection - recommend SPF 30 or higher with frequent application   - Return sooner if noticing changing or symptomatic lesions    # Seborrheic keratoses  - Reassured patient of benign nature, no treatment necessary    Procedures Performed:   - Shave biopsy procedure note, location(s): see above. After discussion of benefits and risks including but not limited to bleeding, infection, scar, incomplete removal, recurrence, and non-diagnostic biopsy, written consent and photographs were obtained. The area was cleaned with isopropyl alcohol. 0.5mL of 1% lidocaine with epinephrine was injected to obtain adequate anesthesia of lesion(s). Shave biopsy at site(s) performed. Hemostasis was achieved with aluminium chloride. Petrolatum ointment and a sterile dressing were applied. The patient  tolerated the procedure and no complications were noted. The patient was provided with verbal and written post care instructions.     Follow-up: 3 month(s) in-person, or earlier for new or changing lesions    Staff and Scribe:     Scribe Disclosure:  I, SHARLENE MERLOS, am serving as a scribe to document services personally performed by Teagan Obrien MD based on data collection and the provider's statements to me.     Provider Disclosure:   The documentation recorded by the scribe accurately reflects the services I personally performed and the decisions made by me.    Teagan Obrien MD    Department of Dermatology  [facility]: Phone: [phone], Fax: [phone]  3/7/2023     ____________________________________________    CC: Derm Problem (Marilu is here today for a lesion of concern on chest. )    HPI:  Ms. Marilu Rondon is a(n) 72 year old female who presents today as a return patient for spot check. Last seen by myself on 4/15/22, at which time the patient underwent cryotherapy for treatment of AKs.    Today, the patient reports a spot on her chest that has been present for about a year has become more raised in nature. Unsure if has grown or not. A little sore when she rubs at it.    Patient is otherwise feeling well, without additional skin concerns.    Labs Reviewed:  N/A    Physical Exam:  Vitals: There were no vitals taken for this visit.  SKIN: Full skin, excluding the groin, which includes the head/face, both arms, chest, back, abdomen,both legs, buttocks, digits and/or nails, was examined.  - Slight maceration at right lateral commissure.  - 1.8 cm thin pink plaque with focal hyperkeratotic papule on the right medial chest.  - Multiple regular brown pigmented macules and papules are identified on the trunk and extremities.   - Scattered brown macules on sun exposed areas.  - There are waxy stuck  on tan to brown papules on the trunk and extremities.   - There is diffuse scaling on the nose.   - No other lesions of concern on areas examined.     Medications:  Current Outpatient Medications   Medication     SIMVASTATIN PO     fluorouracil (EFUDEX) 5 % external cream     triamcinolone (KENALOG) 0.025 % external ointment     No current facility-administered medications for this visit.      Past Medical History:   Patient Active Problem List   Diagnosis     Myopic degeneration     Senile nuclear sclerosis     Chest pain     Left foot pain     Closed displaced fracture of fifth metatarsal bone of left foot     Past Medical History:   Diagnosis Date     Chest pain 8/31/2015     Nonsenile cataract         CC No referring provider defined for this encounter. on close of this encounter.

## 2023-03-03 NOTE — LETTER
3/3/2023       RE: Marilu Rondon  [address]  [place] [address]     Dear Colleague,    Thank you for referring your patient, Marilu Rondon, to the [facility] at [facility]. Please see a copy of my visit note below.    [facility] Dermatology Note  Encounter Date: Mar 3, 2023  Office Visit     Dermatology Problem List:  # FBSE 3/3/2023  1. AKs, s/p cryo  - Efudex/calcipotriene to right cheek Jan 2022 (prescribed through [facility])  - Efudex/calcipotriene to nose March 2023 (prescribed through [facility])  - Monitor AK on R nasal sidewall  2. Focal spongiosis and mild perivascular inflammation , right central cheek s/p biopsy 2/18/2022   - Treated briefly with [facility] but had essentially self-improvement   - also treated as AK as above  3. Angular cheilitis,  - ketoconazole 2% cream  # NUB, right medial chest, s/p shave bx 3/3/2023  ____________________________________________    Assessment & Plan:    # NUB, right medial chest, ddx: ISK, rule out SCC  - Shave biopsy today, see procedure note below    # Actinic skin damage, nose, diffuse  - Start Efudex + calcipotriene BID for 4-10 days (prescribed through [facility])    # Angular cheilitis, right lateral commissure  - Start ketoconazole 2% cream     # Multiple benign nevi  # Solar lentigines   - No concerning lesions today  - Monitor for ABCDEs of melanoma   - Continue sun protection - recommend SPF 30 or higher with frequent application   - Return sooner if noticing changing or symptomatic lesions    # Seborrheic keratoses  - Reassured patient of benign nature, no treatment necessary    Procedures Performed:   - Shave biopsy procedure note, location(s): see above. After discussion of benefits and risks including but not limited to bleeding, infection, scar, incomplete removal, recurrence, and non-diagnostic biopsy, written  consent and photographs were obtained. The area was cleaned with isopropyl alcohol. 0.5mL of 1% lidocaine with epinephrine was injected to obtain adequate anesthesia of lesion(s). Shave biopsy at site(s) performed. Hemostasis was achieved with aluminium chloride. Petrolatum ointment and a sterile dressing were applied. The patient tolerated the procedure and no complications were noted. The patient was provided with verbal and written post care instructions.     Follow-up: 3 month(s) in-person, or earlier for new or changing lesions    Staff and Scribe:     Scribe Disclosure:  I, SHARLENE WEDBEN, am serving as a scribe to document services personally performed by Teagan Obrien MD based on data collection and the provider's statements to me.     Provider Disclosure:   The documentation recorded by the scribe accurately reflects the services I personally performed and the decisions made by me.    Teagan Obrien MD    Department of Dermatology  [facility]: Phone: [phone], Fax: [phone]  3/7/2023     ____________________________________________    CC: Derm Problem (Marilu is here today for a lesion of concern on chest. )    HPI:  Ms. Marilu Rondon is a(n) 72 year old female who presents today as a return patient for spot check. Last seen by myself on 4/15/22, at which time the patient underwent cryotherapy for treatment of AKs.    Today, the patient reports a spot on her chest that has been present for about a year has become more raised in nature. Unsure if has grown or not. A little sore when she rubs at it.    Patient is otherwise feeling well, without additional skin concerns.    Labs Reviewed:  N/A    Physical Exam:  Vitals: There were no vitals taken for this visit.  SKIN: Full skin, excluding the groin, which includes the head/face, both arms, chest, back, abdomen,both legs, buttocks, digits and/or  nails, was examined.  - Slight maceration at right lateral commissure.  - 1.8 cm thin pink plaque with focal hyperkeratotic papule on the right medial chest.  - Multiple regular brown pigmented macules and papules are identified on the trunk and extremities.   - Scattered brown macules on sun exposed areas.  - There are waxy stuck on tan to brown papules on the trunk and extremities.   - There is diffuse scaling on the nose.   - No other lesions of concern on areas examined.     Medications:  Current Outpatient Medications   Medication     SIMVASTATIN PO     fluorouracil (EFUDEX) 5 % external cream     triamcinolone (KENALOG) 0.025 % external ointment     No current facility-administered medications for this visit.      Past Medical History:   Patient Active Problem List   Diagnosis     Myopic degeneration     Senile nuclear sclerosis     Chest pain     Left foot pain     Closed displaced fracture of fifth metatarsal bone of left foot     Past Medical History:   Diagnosis Date     Chest pain 8/31/2015     Nonsenile cataract         CC No referring provider defined for this encounter. on close of this encounter.

## 2023-03-03 NOTE — NURSING NOTE
Lidocaine-epinephrine 1-1:253766 % injection   1.5mL once for one use, starting 3/3/2023 ending 3/3/2023,  2mL disp, R-0, injection  Injected by Jenny Celestin, CMA

## 2023-03-07 LAB
PATH REPORT.COMMENTS IMP SPEC: NORMAL
PATH REPORT.COMMENTS IMP SPEC: NORMAL
PATH REPORT.FINAL DX SPEC: NORMAL
PATH REPORT.GROSS SPEC: NORMAL
PATH REPORT.MICROSCOPIC SPEC OTHER STN: NORMAL
PATH REPORT.RELEVANT HX SPEC: NORMAL

## 2023-03-11 ENCOUNTER — PATIENT OUTREACH (OUTPATIENT)
Dept: DERMATOLOGY | Facility: CLINIC | Age: 73
End: 2023-03-11
Payer: MEDICARE

## 2023-03-11 NOTE — TELEPHONE ENCOUNTER
Attempted to reach patient to schedule follow up in the Dermatology Clinic.  No answer,  LM on VM to call office and [employer] message sent..    Schedule with Dr. Teagan Obrien 6/2023.

## 2023-03-20 ENCOUNTER — PATIENT OUTREACH (OUTPATIENT)
Dept: DERMATOLOGY | Facility: CLINIC | Age: 73
End: 2023-03-20
Payer: MEDICARE

## 2023-03-20 NOTE — TELEPHONE ENCOUNTER
Attempted to reach patient to schedule follow up in the Dermatology Clinic.  No answer,  LM on VM to call office and Letter mailed..    Schedule with Dr. Teagan Obrien 6/2023.

## 2023-03-20 NOTE — LETTER
March 20, 2023          Marilu Rondon  [address]  [place] [address]        Dear Marilu Rondon:    We recently reviewed your medical records from [facility] and found that you are due for an appointment with Dr. Teagan Obrien.  Our office has attempted to reach you via telephone and left a message.    At your earliest convenience, please call the clinic at [phone] to arrange the recommended exam and possible testing.  Please kindly mention this letter when calling so that your appointment(s) can be accurately scheduled.      Sincerely,       The Clinic Staff        Medical Record Number:  [id number]

## 2023-03-21 NOTE — TELEPHONE ENCOUNTER
Pt called and stated she has gone to her PCP for care and will no longer need this f/u appointment any longer. Thanks!

## 2023-06-01 ENCOUNTER — HEALTH MAINTENANCE LETTER (OUTPATIENT)
Age: 73
End: 2023-06-01

## 2023-06-15 ENCOUNTER — OFFICE VISIT (OUTPATIENT)
Dept: OPHTHALMOLOGY | Facility: CLINIC | Age: 73
End: 2023-06-15
Attending: OPHTHALMOLOGY
Payer: MEDICARE

## 2023-06-15 DIAGNOSIS — H51.8 DIVERGENCE INSUFFICIENCY: Primary | ICD-10-CM

## 2023-06-15 DIAGNOSIS — H50.05 ALTERNATING ESOTROPIA: ICD-10-CM

## 2023-06-15 PROCEDURE — 92015 DETERMINE REFRACTIVE STATE: CPT | Mod: GY

## 2023-06-15 PROCEDURE — V2718 FRESNELL PRISM PRESS-ON LENS: HCPCS

## 2023-06-15 ASSESSMENT — REFRACTION_MANIFEST
OS_ADD: +2.50
OD_CYLINDER: +0.50
OS_CYLINDER: +1.75
OD_SPHERE: -4.00
OS_SPHERE: -4.75
OD_AXIS: 165
OD_ADD: +2.50
OS_AXIS: 076

## 2023-06-15 ASSESSMENT — REFRACTION_WEARINGRX
OS_CYLINDER: +1.50
OD_ADD: +2.50
OD_SPHERE: -4.00
OS_AXIS: 078
OD_AXIS: 140
OS_SPHERE: -4.25
OD_CYLINDER: +0.50
OS_ADD: +2.50

## 2023-06-15 ASSESSMENT — REFRACTION_FINALRX
OD_HBASE: OUT
OD_HPRISM: 2.0
OS_HBASE: OUT
OS_HPRISM: 2.0
OD_HBASE: OUT
OS_HPRISM: 2.0
OD_HPRISM: 2.0
OS_HBASE: OUT

## 2023-06-15 ASSESSMENT — VISUAL ACUITY
METHOD: SNELLEN - LINEAR
OD_PH_CC: 20/30
OD_CC: 20/40
CORRECTION_TYPE: GLASSES
OD_CC+: +2
OD_PH_CC+: +3
OS_CC: 20/30
OS_PH_CC: 20/25
OS_CC+: -1

## 2023-06-15 NOTE — PROGRESS NOTES
Assessment & Plan    Marilu Rondon is a 72 year old female with the following diagnoses:   1. Divergence insufficiency    2. Alternating esotropia      Patient was sent for consultation by Dr. Gonzalez for occasional diplopia that has been more frequent in the last few months.  She notices the horizontal diplopia more so in the distance, when tired, and after a glass of wine.  She has never experienced double vision before, no prisms in glasses.  On sensorimotor exam, she has an alternating esotropia that fits with the pattern of divergence insufficiency.  There is a trace of adduction deficit over the left medial rectus but no abduction deficit otherwise.  Divergence amps are reduced both at distance and near.  She is able to fuse with a small amount of horizontal prisms, 4pd base out, at distance and near.  A refraction was performed and we updated her Rx and incorporated a total of 2pd base out prism to each eye.  Patient requested a separate pair of no line bifocal prism glasses and single vision distance prism glasses.  We also applied a Fresnel prism 4pd base out until she receives her prism glasses.     Patient has participated in clinical trials in the past with pediatric ophthalmologists/orthoptists in [place].  Worked on clinical trials: preoperative evaluation of prism adaptation tests in pediatric populations.    Patient disposition:   Return if symptoms worsen or fail to improve.       NIK Israel 6/15/2023 11:44 AM

## 2024-06-08 ENCOUNTER — HEALTH MAINTENANCE LETTER (OUTPATIENT)
Age: 74
End: 2024-06-08

## 2024-08-16 ENCOUNTER — MEDICAL CORRESPONDENCE (OUTPATIENT)
Dept: HEALTH INFORMATION MANAGEMENT | Facility: CLINIC | Age: 74
End: 2024-08-16
Payer: MEDICARE

## 2024-08-16 ENCOUNTER — TRANSFERRED RECORDS (OUTPATIENT)
Dept: HEALTH INFORMATION MANAGEMENT | Facility: CLINIC | Age: 74
End: 2024-08-16
Payer: MEDICARE

## 2024-08-17 ENCOUNTER — HEALTH MAINTENANCE LETTER (OUTPATIENT)
Age: 74
End: 2024-08-17

## 2024-08-19 ENCOUNTER — TRANSCRIBE ORDERS (OUTPATIENT)
Dept: OTHER | Age: 74
End: 2024-08-19

## 2024-08-19 DIAGNOSIS — Z12.11 SCREEN FOR COLON CANCER: Primary | ICD-10-CM

## 2024-09-25 ENCOUNTER — TELEPHONE (OUTPATIENT)
Dept: GASTROENTEROLOGY | Facility: CLINIC | Age: 74
End: 2024-09-25
Payer: MEDICARE

## 2024-09-25 NOTE — TELEPHONE ENCOUNTER
"Endoscopy Scheduling Screen    Have you had any respiratory illness or flu-like symptoms in the last 10 days?  No    What is your communication preference for Instructions and/or Bowel Prep?   MyChart    What insurance is in the chart?  Other:  Medicare    Ordering/Referring Provider:   CARMEN STEVEN        (If ordering provider performs procedure, schedule with ordering provider unless otherwise instructed. )    BMI: Estimated body mass index is 24.33 kg/m  as calculated from the following:    Height as of 4/18/18: 1.727 m (5' 8\").    Weight as of 4/18/18: 72.6 kg (160 lb).     Sedation Ordered  MAC/deep sedation.   BMI<= 45 45 < BMI <= 48 48 < BMI < = 50  BMI > 50   No Restrictions No [facility]  No [facility]  [facility] with exceptions Hospital Only OR Only       Do you have a history of malignant hyperthermia?  No    (Females) Are you currently pregnant?   No     Have you been diagnosed or told you have pulmonary hypertension?   No    Do you have an LVAD?  No    Have you been told you have moderate to severe sleep apnea?  No.    Have you been told you have COPD, asthma, or any other lung disease?  No    Do you have any heart conditions?  No     Have you ever had or are you waiting for an organ transplant?  No. Continue scheduling, no site restrictions.    Have you had a stroke or transient ischemic attack (TIA aka \"mini stroke\" in the last 6 months?   No    Have you been diagnosed with or been told you have cirrhosis of the liver?   No.    Are you currently on dialysis?   No    Do you need assistance transferring?   No    BMI: Estimated body mass index is 24.33 kg/m  as calculated from the following:    Height as of 4/18/18: 1.727 m (5' 8\").    Weight as of 4/18/18: 72.6 kg (160 lb).     Is patients BMI > 40 and scheduling location [facility]?  No    Do you take an injectable or oral medication for weight loss or diabetes (excluding insulin)?  No    Do you take the medication Naltrexone?  No    Do you take blood " thinners?  No       Prep   Are you currently on dialysis or do you have chronic kidney disease?  No    Do you have a diagnosis of diabetes?  No    Do you have a diagnosis of cystic fibrosis (CF)?  No    On a regular basis do you go 3 -5 days between bowel movements?  No    BMI > 40?  No    Preferred Pharmacy:        [id number] - [place], [place] - [address] [place] [address]  Phone: [phone] Fax: [phone]      Final Scheduling Details     Procedure scheduled  Colonoscopy    Surgeon:  Amina ---patient requested any provider    Date of procedure:  1/23/25     Pre-OP / PAC:   No - Not required for this site.    Location  [facility] - Patient preference.    Sedation   MAC/Deep Sedation - Per order.      Patient Reminders:   You will receive a call from a Nurse to review instructions and health history.  This assessment must be completed prior to your procedure.  Failure to complete the Nurse assessment may result in the procedure being cancelled.      On the day of your procedure, please designate an adult(s) who can drive you home stay with you for the next 24 hours. The medicines used in the exam will make you sleepy. You will not be able to drive.      You cannot take public transportation, ride share services, or non-medical taxi service without a responsible caregiver.  Medical transport services are allowed with the requirement that a responsible caregiver will receive you at your destination.  We require that drivers and caregivers are confirmed prior to your procedure.

## 2025-01-10 ENCOUNTER — TELEPHONE (OUTPATIENT)
Dept: GASTROENTEROLOGY | Facility: CLINIC | Age: 75
End: 2025-01-10

## 2025-01-10 NOTE — TELEPHONE ENCOUNTER
Pre visit planning completed.      Procedure details:    Patient scheduled for Colonoscopy on 1/23/25.     Arrival time: 0930. Procedure time 1030    Facility location: [facility]; [address] [place] [address], [address] Floor, [place] [address]. Check in location: 5th Floor.    Sedation type: MAC    Pre op exam needed? No.    Indication for procedure: screening       Chart review:     Electronic implanted devices? No    Recent diagnosis of diverticulitis within the last 6 weeks? No      Medication review:    Diabetic? No    Anticoagulants? No    Weight loss medication/injectable? No GLP-1 medication per patient's medication list. Nursing to verify with pre-assessment call.    Other medication HOLDING recommendations:  N/A      Prep for procedure:     Bowel prep recommendation: Standard Miralax.   Due to: standard bowel prep    Procedure information and instructions sent via [facility]  Endoscopy Procedure Pre Assessment   [phone] option 2

## 2025-01-10 NOTE — TELEPHONE ENCOUNTER
Attempted to contact patient in order to complete pre assessment questions.     No answer. Left message to return call to [phone] option 2    Callback communication sent via [employer].      Socorro Judd LPN  Endoscopy Procedure Pre Assessment

## 2025-01-13 NOTE — TELEPHONE ENCOUNTER
Pre assessment completed for upcoming procedure.   (Please see previous telephone encounter notes for complete details)    Patient returned call.       Procedure details:    Arrival time and facility location reviewed.    Pre op exam needed? No.    Designated  policy reviewed. Instructed to have someone stay 24  hours post procedure.       Medication review:    Medications reviewed. Please see supporting documentation below. Holding recommendations discussed (if applicable).   N/A      Prep for procedure:     Procedure prep instructions reviewed.        Any additional information needed:  N/A      Patient verbalized understanding and had no questions or concerns at this time.      Nely Esquivel RN  Endoscopy Procedure Pre Assessment   [phone] option 2

## 2025-01-22 ENCOUNTER — ANESTHESIA EVENT (OUTPATIENT)
Dept: SURGERY | Facility: AMBULATORY SURGERY CENTER | Age: 75
End: 2025-01-22
Payer: MEDICARE

## 2025-01-23 ENCOUNTER — ANESTHESIA (OUTPATIENT)
Dept: SURGERY | Facility: AMBULATORY SURGERY CENTER | Age: 75
End: 2025-01-23
Payer: MEDICARE

## 2025-01-23 VITALS — HEART RATE: 71 BPM

## 2025-01-23 PROCEDURE — 88305 TISSUE EXAM BY PATHOLOGIST: CPT | Mod: TC | Performed by: INTERNAL MEDICINE

## 2025-01-23 PROCEDURE — 88305 TISSUE EXAM BY PATHOLOGIST: CPT | Mod: 26 | Performed by: STUDENT IN AN ORGANIZED HEALTH CARE EDUCATION/TRAINING PROGRAM

## 2025-01-23 RX ORDER — PROPOFOL 10 MG/ML
INJECTION, EMULSION INTRAVENOUS CONTINUOUS PRN
Status: DISCONTINUED | OUTPATIENT
Start: 2025-01-23 | End: 2025-01-23

## 2025-01-23 RX ORDER — LIDOCAINE HYDROCHLORIDE 20 MG/ML
INJECTION, SOLUTION INFILTRATION; PERINEURAL PRN
Status: DISCONTINUED | OUTPATIENT
Start: 2025-01-23 | End: 2025-01-23

## 2025-01-23 RX ORDER — SODIUM CHLORIDE, SODIUM LACTATE, POTASSIUM CHLORIDE, CALCIUM CHLORIDE 600; 310; 30; 20 MG/100ML; MG/100ML; MG/100ML; MG/100ML
INJECTION, SOLUTION INTRAVENOUS CONTINUOUS PRN
Status: DISCONTINUED | OUTPATIENT
Start: 2025-01-23 | End: 2025-01-23

## 2025-01-23 RX ADMIN — PROPOFOL 150 MCG/KG/MIN: 10 INJECTION, EMULSION INTRAVENOUS at 09:06

## 2025-01-23 RX ADMIN — LIDOCAINE HYDROCHLORIDE 50 MG: 20 INJECTION, SOLUTION INFILTRATION; PERINEURAL at 09:06

## 2025-01-23 RX ADMIN — SODIUM CHLORIDE, SODIUM LACTATE, POTASSIUM CHLORIDE, CALCIUM CHLORIDE: 600; 310; 30; 20 INJECTION, SOLUTION INTRAVENOUS at 08:40

## 2025-01-23 NOTE — ANESTHESIA PREPROCEDURE EVALUATION
Anesthesia Pre-Procedure Evaluation    Patient: Marilu Rondon   MRN: [id number] : 1950        Procedure : Procedure(s):  Colonoscopy          Past Medical History:   Diagnosis Date    Chest pain 2015    Nonsenile cataract       Past Surgical History:   Procedure Laterality Date    CATARACT IOL, RT/LT  14    RE    CATARACT IOL, RT/LT Left 02/15/2017    ENHANCE LASER REFRACTIVE BILATERAL EXISTING PT IN PARAMETERS      PHACOEMULSIFICATION WITH STANDARD INTRAOCULAR LENS IMPLANT Left 2/15/2017    Procedure: PHACOEMULSIFICATION WITH STANDARD INTRAOCULAR LENS IMPLANT;  Surgeon: Pramod Britt MD;  Location: [facility] OR    YAG CAPSULOTOMY OS (LEFT EYE) Left 2021    ZZC RAD RESEC TONSIL/PILLARS        No Known Allergies   Social History     Tobacco Use    Smoking status: Never    Smokeless tobacco: Never   Substance Use Topics    Alcohol use: Yes     Comment: 1 glass of red wine daily.      Wt Readings from Last 1 Encounters:   25 67.1 kg (148 lb)        Anesthesia Evaluation   Pt has had prior anesthetic.     No history of anesthetic complications       ROS/MED HX  ENT/Pulmonary:  - neg pulmonary ROS     Neurologic:  - neg neurologic ROS     Cardiovascular:  - neg cardiovascular ROS     METS/Exercise Tolerance: >4 METS    Hematologic:  - neg hematologic  ROS     Musculoskeletal:  - neg musculoskeletal ROS     GI/Hepatic:  - neg GI/hepatic ROS     Renal/Genitourinary:  - neg Renal ROS     Endo:  - neg endo ROS     Psychiatric/Substance Use:  - neg psychiatric ROS     Infectious Disease:  - neg infectious disease ROS     Malignancy:  - neg malignancy ROS     Other:  - neg other ROS          Physical Exam    Airway        Mallampati: II   TM distance: > 3 FB   Neck ROM: full   Mouth opening: > 3 cm    Respiratory Devices and Support         Dental       (+) Minor Abnormalities - some fillings, tiny chips      Cardiovascular   cardiovascular exam normal          Pulmonary   pulmonary exam  "normal                OUTSIDE LABS:  CBC:   Lab Results   Component Value Date    WBC 3.9 (L) 03/20/2012    HGB 12.6 03/20/2012    HCT 39.0 03/20/2012     03/20/2012     BMP:   Lab Results   Component Value Date     09/02/2015     03/20/2012    POTASSIUM 4.2 09/02/2015    POTASSIUM 4.1 03/20/2012    CHLORIDE 112 (H) 09/02/2015    CHLORIDE 107 03/20/2012    CO2 25 09/02/2015    CO2 29 03/20/2012    BUN 14 09/02/2015    BUN 16 03/20/2012    CR 0.76 09/02/2015    CR 0.73 03/20/2012    GLC 91 09/02/2015    GLC 76 03/20/2012     COAGS: No results found for: \"PTT\", \"INR\", \"FIBR\"  POC: No results found for: \"BGM\", \"HCG\", \"HCGS\"  HEPATIC:   Lab Results   Component Value Date    ALBUMIN 3.9 03/20/2012    PROTTOTAL 6.9 03/20/2012    ALT <6 03/20/2012    AST 26 03/20/2012    ALKPHOS 60 03/20/2012    BILITOTAL 0.3 03/20/2012     OTHER:   Lab Results   Component Value Date    DAVID 9.0 09/02/2015    LIPASE 108 03/20/2012    AMYLASE 112 (H) 03/20/2012    TSH 1.31 03/20/2012    T4 0.88 03/20/2012    CRP 6.3 03/20/2012    SED 18 03/20/2012       Anesthesia Plan    ASA Status:  2    NPO Status:  NPO Appropriate    Anesthesia Type: MAC.     - Reason for MAC: immobility needed, straight local not clinically adequate      Maintenance: TIVA.        Consents    Anesthesia Plan(s) and associated risks, benefits, and realistic alternatives discussed. Questions answered and patient/representative(s) expressed understanding.     - Discussed:     - Discussed with:  Patient            Postoperative Care       PONV prophylaxis: Background Propofol Infusion     Comments:               Дмитрий Ennis MD    I have reviewed the pertinent notes and labs in the chart from the past 30 days and (re)examined the patient.  Any updates or changes from those notes are reflected in this note.    Clinically Significant Risk Factors Present on Admission                                          "

## 2025-01-23 NOTE — ANESTHESIA CARE TRANSFER NOTE
Patient: Marilu Rondon    Procedure: Procedure(s):  COLONOSCOPY, WITH POLYPECTOMY       Diagnosis: Screen for colon cancer [Z12.11]  Diagnosis Additional Information: No value filed.    Anesthesia Type:   MAC     Note:    Oropharynx: oropharynx clear of all foreign objects and spontaneously breathing  Level of Consciousness: awake  Oxygen Supplementation: room air  Level of Supplemental Oxygen (L/min / FiO2): 0  Independent Airway: airway patency satisfactory and stable  Dentition: dentition unchanged  Vital Signs Stable: post-procedure vital signs reviewed and stable  Report to RN Given: handoff report given  Patient transferred to: Phase II  Comments: Uneventful transport   Report to RN - Katheryn  Exchanging well; color natl  Pt responds appropriately to command  IV patent  Lips/teeth/dentition as preop status  Questions answered      Handoff Report: Identifed the Patient, Identified the Reponsible Provider, Reviewed the pertinent medical history, Discussed the surgical course, Reviewed Intra-OP anesthesia mangement and issues during anesthesia, Set expectations for post-procedure period and Allowed opportunity for questions and acknowledgement of understanding      Vitals:  Vitals Value Taken Time   BP 96/61 01/23/25 0938   Temp 36.1  C (97  F) 01/23/25 0938   Pulse 68 01/23/25 0938   Resp 18 01/23/25 0938   SpO2 98 % 01/23/25 0938   Vitals shown include unfiled device data.    Electronically Signed By: KIRAN SALVADOR CRNA  January 23, 2025  9:40 AM

## 2025-02-06 PROBLEM — D12.6 ADENOMATOUS POLYP OF COLON: Status: ACTIVE | Noted: 2025-02-06

## 2025-04-16 ENCOUNTER — OFFICE VISIT (OUTPATIENT)
Dept: URGENT CARE | Facility: URGENT CARE | Age: 75
End: 2025-04-16
Payer: MEDICARE

## 2025-04-16 ENCOUNTER — ANCILLARY PROCEDURE (OUTPATIENT)
Dept: GENERAL RADIOLOGY | Facility: CLINIC | Age: 75
End: 2025-04-16
Attending: INTERNAL MEDICINE
Payer: MEDICARE

## 2025-04-16 VITALS
WEIGHT: 152 LBS | HEART RATE: 67 BPM | DIASTOLIC BLOOD PRESSURE: 79 MMHG | RESPIRATION RATE: 15 BRPM | OXYGEN SATURATION: 98 % | BODY MASS INDEX: 23.11 KG/M2 | SYSTOLIC BLOOD PRESSURE: 128 MMHG | TEMPERATURE: 97.6 F

## 2025-04-16 DIAGNOSIS — S92.512A CLOSED DISPLACED FRACTURE OF PROXIMAL PHALANX OF LESSER TOE OF LEFT FOOT, INITIAL ENCOUNTER: Primary | ICD-10-CM

## 2025-04-16 DIAGNOSIS — S99.922A FOOT INJURY, LEFT, INITIAL ENCOUNTER: ICD-10-CM

## 2025-04-16 PROCEDURE — 99204 OFFICE O/P NEW MOD 45 MIN: CPT | Performed by: INTERNAL MEDICINE

## 2025-04-16 PROCEDURE — 3074F SYST BP LT 130 MM HG: CPT | Performed by: INTERNAL MEDICINE

## 2025-04-16 PROCEDURE — 3078F DIAST BP <80 MM HG: CPT | Performed by: INTERNAL MEDICINE

## 2025-04-16 PROCEDURE — 73630 X-RAY EXAM OF FOOT: CPT | Mod: TC | Performed by: RADIOLOGY

## 2025-04-16 NOTE — PROGRESS NOTES
ASSESSMENT AND PLAN:      ICD-10-CM    1. Closed displaced fracture of proximal phalanx of lesser toe of left foot, initial encounter  S92.512A Ankle/Foot Bracing Supplies Order Post-op Shoe; Left     Orthopedic  Referral     Nursing Communication 1      2. Foot injury, left, initial encounter  S99.922A XR Foot Left G/E 3 Views     Ankle/Foot Bracing Supplies Order Post-op Shoe; Left            Patient Instructions   fracture - may be in area of old fracture = rebroken   fracture is 5th baby toe proximal first bone    Post shoe  Sam tape  Ice    Podiatry referral    Vitamin D          Latoya Hernández MD  [facility] URGENT CARE    Subjective     Marilu Rondon is a 74 year old who presents for Patient presents with:  Urgent Care: - Monday  - Hit Left Foot on Couch  - Foot has been in pain ever since   - Concerned of fx (Has hx of fx in this foot)  - Would like xray to rule out    a new patient of [facility] includes the [facility].    MS Injury/Pain    Onset of symptoms was 3 day(s) ago.  Location: left foot  Context:       The injury happened while at home      Mechanism: direct blow -accidentally kicked a couch with h left foot      Patient experienced immediate pain, delayed swelling  Course of symptoms is improving.      Current and Associated symptoms: Pain and Swelling  Denies  Decreased range of motion  Aggravating Factors: weight-bearing  Therapies to improve symptoms include: none  This is not the first time this type of problem has occurred for this patient.   Broken toe 5th toe & metatarsal in this foot  Going to the [place] soon for line dancing    Review of Systems        Objective    /79   Pulse 67   Temp 97.6  F (36.4  C) (Temporal)   Resp 15   Wt 68.9 kg (152 lb)   SpO2 98%   BMI 23.11 kg/m    Physical Exam  Vitals reviewed.   Constitutional:       Appearance: Normal appearance.   Musculoskeletal:      Comments: Examination of left ankle and foot.  Left ankle exam unremarkable nontender  without swelling.  Patient has significant ecchymosis at 2nd and 3rd MCP joints although these areas are nontender.  Pain is along fourth metatarsal and at the fifth MCP joint without significant swelling or ecchymosis of these areas   Neurological:      Mental Status: She is alert.            Xray - Reviewed and interpreted by me.  fracture at 5th proximal phalyx toe

## 2025-04-16 NOTE — PATIENT INSTRUCTIONS
fracture - may be in area of old fracture = rebroken   fracture is 5th baby toe proximal first bone    Post shoe  Sam tape  Ice    Podiatry referral    Vitamin D

## 2025-05-14 ENCOUNTER — OFFICE VISIT (OUTPATIENT)
Dept: OPHTHALMOLOGY | Facility: CLINIC | Age: 75
End: 2025-05-14
Attending: OPHTHALMOLOGY
Payer: MEDICARE

## 2025-05-14 DIAGNOSIS — H35.341 MACULAR HOLE OF RIGHT EYE: Primary | ICD-10-CM

## 2025-05-14 PROCEDURE — G0463 HOSPITAL OUTPT CLINIC VISIT: HCPCS | Performed by: OPHTHALMOLOGY

## 2025-05-14 PROCEDURE — 92134 CPTRZ OPH DX IMG PST SGM RTA: CPT | Performed by: OPHTHALMOLOGY

## 2025-05-14 ASSESSMENT — REFRACTION_WEARINGRX
OS_AXIS: 076
OS_CYLINDER: +1.75
OD_HPRISM: 2.0
OD_AXIS: 165
OS_ADD: +2.50
OD_HBASE: OUT
OS_HPRISM: 2.0
OS_SPHERE: -4.75
OS_HBASE: OUT
OS_HPRISM: 2.0
OD_CYLINDER: +0.50
OS_SPHERE: -4.75
OD_ADD: +2.50
OD_AXIS: 165
OD_HPRISM: 2.0
OS_CYLINDER: +1.75
OS_AXIS: 076
OD_SPHERE: -4.00
OD_SPHERE: -4.00
OS_HBASE: OUT
OD_HBASE: OUT
OD_CYLINDER: +0.50

## 2025-05-14 ASSESSMENT — VISUAL ACUITY
OS_CC+: -2
METHOD: SNELLEN - LINEAR
OS_CC: 20/30
CORRECTION_TYPE: GLASSES
OD_CC: 20/40
OD_CC+: -2

## 2025-05-14 ASSESSMENT — CONF VISUAL FIELD
OD_INFERIOR_NASAL_RESTRICTION: 3
OS_NORMAL: 1
OS_SUPERIOR_NASAL_RESTRICTION: 0
OS_INFERIOR_TEMPORAL_RESTRICTION: 0
OS_INFERIOR_NASAL_RESTRICTION: 0
OS_SUPERIOR_TEMPORAL_RESTRICTION: 0

## 2025-05-14 ASSESSMENT — CUP TO DISC RATIO
OD_RATIO: 0.2
OS_RATIO: 0.2

## 2025-05-14 ASSESSMENT — SLIT LAMP EXAM - LIDS
COMMENTS: NORMAL
COMMENTS: NORMAL

## 2025-05-14 ASSESSMENT — TONOMETRY
OD_IOP_MMHG: 15
OS_IOP_MMHG: 16
IOP_METHOD: TONOPEN

## 2025-05-14 ASSESSMENT — EXTERNAL EXAM - RIGHT EYE: OD_EXAM: NORMAL

## 2025-05-14 ASSESSMENT — EXTERNAL EXAM - LEFT EYE: OS_EXAM: NORMAL

## 2025-05-14 NOTE — PROGRESS NOTES
CC: Floaters both eyes   Interval: Reports tno changes in vision; no flashes and floaters. Has not used her amsler grid, has not noticed any worsening distortion.   HPI: Here for annual exam. Notes occasional diplopia when tired. Improved with prisms.   It disappears when she closes one eye. She is uncertain if it is horizontal or vertical. She has noticed it over the past 6 months very rarely. Stable flashes and floaters. No eye pain. No gtts.     Retinal Imaging:  OCT  05/14/25   RE: mild Epiretinal membrane; schisis and lamellar hole; no subretinal fluid, thin choroid, drusen like deposits-- stable Optical Coherence Tomography   LE: blunt foveal contour, 1+ ERM with disruption of foveal contour; no disruption of IS/left eye, thin choroid    Assessment & Plan:    #ERM, OS  -slight worsening of foveal contour  -VA 20/30, patient not bothered at this time.     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 Britt  Status post yag laser right eye 2017; left eye 2021    # Posterior vitreous detachment (PVD) both eyes   Patient bother by floaters right eye-  Discussed with patient treatment options: observe vs fluorectomy    # History of laser in situ keratomileusis (LASIK) surgery both eyes 20 ys ago  Stable     #Esophoria  No hx of strabismus sx or patching hx; denies eye trauma  Occasional diplopia when tired, reports she has noticed a vertical strabismus   Improved with prism; but prefers to check again as it seems like need new  prescription     Retina:  First available with orthoptist   Carlos:  1 year, OCT, DFE both eyes and optos    Beto Benítez MD  PGY-6 Vitreoretinal Surgery Fellow  [facility]  ~~~~~~~~~~~~~~~~~~~~~~~~~~~~~~~~~~   Complete documentation of historical and exam elements from today's encounter can be found in the full encounter summary report (not reduplicated in this progress note).  I personally obtained the chief complaint(s) and history of present illness.  I confirmed and edited as necessary the review of systems, past medical/surgical history, family history, social history, and examination findings as documented by others; and I examined the patient myself.  I personally reviewed the relevant tests, images, and reports as documented above.  I personally reviewed the ophthalmic test(s) associated with this encounter, agree with the interpretation(s) as documented by the resident/fellow, and have edited the corresponding report(s) as necessary.   I formulated and edited as necessary the assessment and plan and discussed the findings and management plan with the patient and family    Tiffany Gonzalez MD  .  Retina Service   Department of Ophthalmology and Visual Neurosciences   [facility]  Phone: [phone]   Fax: [phone]

## 2025-05-14 NOTE — NURSING NOTE
Chief Complaints and History of Present Illnesses   Patient presents with    Follow Up     Degenerative myopia of both eyes,     Chief Complaint(s) and History of Present Illness(es)       Follow Up              Comments: Degenerative myopia of both eyes,              Comments    States some decreased vision since last visit   Pt requests RX for glasses need to see orthoptist for prism evaluation (will make another appt with Demetra Rivera)  States no change in floaters   No flashes, eye pain or redness    Demetra Hammer COT 8:54 AM May 14, 2025

## 2025-05-15 ENCOUNTER — OFFICE VISIT (OUTPATIENT)
Dept: OPHTHALMOLOGY | Facility: CLINIC | Age: 75
End: 2025-05-15
Attending: OPHTHALMOLOGY
Payer: MEDICARE

## 2025-05-15 DIAGNOSIS — H51.8 DIVERGENCE INSUFFICIENCY: Primary | ICD-10-CM

## 2025-05-15 DIAGNOSIS — H50.05 ALTERNATING ESOTROPIA: ICD-10-CM

## 2025-05-15 PROCEDURE — 92015 DETERMINE REFRACTIVE STATE: CPT | Mod: GY

## 2025-05-15 PROCEDURE — 92060 SENSORIMOTOR EXAMINATION: CPT

## 2025-05-15 ASSESSMENT — CONF VISUAL FIELD
OS_SUPERIOR_NASAL_RESTRICTION: 0
OS_NORMAL: 1
OS_INFERIOR_TEMPORAL_RESTRICTION: 0
OD_INFERIOR_TEMPORAL_RESTRICTION: 0
OS_SUPERIOR_TEMPORAL_RESTRICTION: 0
METHOD: COUNTING FINGERS
OS_INFERIOR_NASAL_RESTRICTION: 0
OD_NORMAL: 1
OD_SUPERIOR_NASAL_RESTRICTION: 0
OD_INFERIOR_NASAL_RESTRICTION: 0
OD_SUPERIOR_TEMPORAL_RESTRICTION: 0

## 2025-05-15 ASSESSMENT — REFRACTION_MANIFEST
OD_AXIS: 172
OD_ADD: +2.75
OS_AXIS: 075
OS_ADD: +2.75
OD_CYLINDER: +0.75
OS_SPHERE: -5.00
OD_SPHERE: -4.50
OS_CYLINDER: +1.75

## 2025-05-15 ASSESSMENT — REFRACTION_WEARINGRX
OS_AXIS: 076
OS_CYLINDER: +1.75
OS_HPRISM: 2.0
OS_SPHERE: -4.75
OD_SPHERE: -4.00
OD_AXIS: 165
OD_HBASE: OUT
OS_HPRISM: 2.0
OD_HPRISM: 2.0
OD_HBASE: OUT
OS_ADD: +2.50
OS_AXIS: 076
OD_AXIS: 165
OS_HBASE: OUT
OD_CYLINDER: +0.50
OD_SPHERE: -4.00
OS_SPHERE: -4.75
OD_ADD: +2.50
OS_HBASE: OUT
OS_CYLINDER: +1.75
OD_CYLINDER: +0.50
OD_HPRISM: 2.0

## 2025-05-15 ASSESSMENT — VISUAL ACUITY
METHOD: SNELLEN - LINEAR
OD_CC+: -2
OD_CC: 20/30
CORRECTION_TYPE: GLASSES
OS_CC: 20/20
OS_CC+: -1

## 2025-05-15 ASSESSMENT — REFRACTION_FINALRX
OS_HPRISM: 2.5
OD_HBASE: BASE OUT
OD_HPRISM: 2.5
OS_HBASE: BASE OUT
OD_VPRISM: 1.0

## 2025-05-15 NOTE — PROGRESS NOTES
Assessment & Plan    Marilu Rondon is a 74 year old female with the following diagnoses:   1. Divergence insufficiency    2. Alternating esotropia       Mrailu was last seen in the orthoptic clinic on 06/15/23, where she was diagnosed with divergence insufficiency and prescribed prism glasses with 2 prism diopters base out in each eye. In the interim, she has been under the care of Dr. Gonzalez for retinal management. She now returns to the orthoptic clinic for an updated prism glasses prescription.  She reports being mostly asymptomatic with her current glasses but has recently noticed some vertical diplopia.    On today's sensorimotor examination, she demonstrated a small-angle alternating esotropia and a right hypertropia, along with mild adduction deficits in both eyes. She was able to achieve fusion with the addition of both horizontal (base out) and vertical (base down) prism for her right hypertropia.  A new glasses prescription was provided, incorporating 2.5 prism diopters base out in each eye and 1 prism diopter base down over the right eye.    Plan:  Return if symptoms worsen or fail to improve.       NIK Israel 5/15/2025 1:14 PM     17

## 2025-06-15 ENCOUNTER — HEALTH MAINTENANCE LETTER (OUTPATIENT)
Age: 75
End: 2025-06-15

## (undated) DEVICE — EYE SOL BSS 500ML

## (undated) DEVICE — SYR 01ML TBC SLIP TIP W/O NDL

## (undated) DEVICE — ENDO CAP AND TUBING STERILE FOR ENDOGATOR  100130

## (undated) DEVICE — LINEN TOWEL PACK X5 5464

## (undated) DEVICE — TAPE DURAPORE 3" SILK 1538-3

## (undated) DEVICE — SPECIMEN CONTAINER 3OZ W/FORMALIN 59901

## (undated) DEVICE — PACK CATARACT CUSTOM ASC SEY15CPUMC

## (undated) DEVICE — GLOVE PROTEXIS MICRO 8.0  2D73PM80

## (undated) DEVICE — EYE PREP BETADINE 5% SOLUTION 30ML 0065-0411-30

## (undated) DEVICE — WIPE PREMOIST CLEANSING WASHCLOTHS 7988

## (undated) DEVICE — EYE SHIELD PLASTIC

## (undated) DEVICE — ENDO CONNECTOR ENDOGATOR AUX WATER JET FOR OLYMPUS SCOPE

## (undated) DEVICE — ENDO FORCEP ENDOJAW BIOPSY 2.8MMX230CM FB-220U

## (undated) DEVICE — SOL WATER IRRIG 500ML BOTTLE 2F7113

## (undated) DEVICE — SOL WATER IRRIG 1000ML BOTTLE 2F7114

## (undated) DEVICE — SUCTION MANIFOLD NEPTUNE 2 SYS 4 PORT 0702-020-000

## (undated) DEVICE — NDL BLUNT 18GA 1.5" W/O FILTER 305180

## (undated) DEVICE — EYE KNIFE SLIT XSTAR VISITEC 2.8MM 45DEG 373728

## (undated) DEVICE — EYE PACK BVI READYPAK KIT #3

## (undated) RX ORDER — FENTANYL CITRATE 50 UG/ML
INJECTION, SOLUTION INTRAMUSCULAR; INTRAVENOUS
Status: DISPENSED
Start: 2017-02-15

## (undated) RX ORDER — FENTANYL CITRATE 50 UG/ML
INJECTION, SOLUTION INTRAMUSCULAR; INTRAVENOUS
Status: DISPENSED
Start: 2018-04-18